# Patient Record
Sex: MALE | Race: WHITE
[De-identification: names, ages, dates, MRNs, and addresses within clinical notes are randomized per-mention and may not be internally consistent; named-entity substitution may affect disease eponyms.]

---

## 2017-04-17 ENCOUNTER — HOSPITAL ENCOUNTER (OUTPATIENT)
Dept: HOSPITAL 17 - CLAB | Age: 82
End: 2017-04-17
Payer: MEDICARE

## 2017-04-17 DIAGNOSIS — E78.5: ICD-10-CM

## 2017-04-17 DIAGNOSIS — L93.0: Primary | ICD-10-CM

## 2017-04-17 DIAGNOSIS — C61: ICD-10-CM

## 2017-04-17 LAB
ALP SERPL-CCNC: 73 U/L (ref 45–117)
ALT SERPL-CCNC: 32 U/L (ref 12–78)
ANION GAP SERPL CALC-SCNC: 7 MEQ/L (ref 5–15)
AST SERPL-CCNC: 22 U/L (ref 15–37)
BILIRUB SERPL-MCNC: 0.5 MG/DL (ref 0.2–1)
BUN SERPL-MCNC: 15 MG/DL (ref 7–18)
CHLORIDE SERPL-SCNC: 106 MEQ/L (ref 98–107)
ERYTHROCYTE [DISTWIDTH] IN BLOOD BY AUTOMATED COUNT: 13.9 % (ref 11.6–17.2)
GFR SERPLBLD BASED ON 1.73 SQ M-ARVRAT: 65 ML/MIN (ref 89–?)
HCO3 BLD-SCNC: 27.7 MEQ/L (ref 21–32)
HCT VFR BLD CALC: 39.3 % (ref 39–51)
HDLC SERPL-MCNC: 46.4 MG/DL (ref 40–60)
LDLC SERPL-MCNC: 74 MG/DL (ref 0–99)
MCH RBC QN AUTO: 30.7 PG (ref 27–34)
MCHC RBC AUTO-ENTMCNC: 33.9 % (ref 32–36)
MCV RBC AUTO: 90.8 FL (ref 80–100)
PLATELET # BLD: 246 TH/MM3 (ref 150–450)
POTASSIUM SERPL-SCNC: 4.2 MEQ/L (ref 3.5–5.1)
RBC # BLD AUTO: 4.32 MIL/MM3 (ref 4.5–5.9)
REVIEW FLAG: (no result)
SODIUM SERPL-SCNC: 141 MEQ/L (ref 136–145)
WBC # BLD AUTO: 7.6 TH/MM3 (ref 4–11)

## 2017-04-17 PROCEDURE — 85027 COMPLETE CBC AUTOMATED: CPT

## 2017-04-17 PROCEDURE — 80061 LIPID PANEL: CPT

## 2017-04-17 PROCEDURE — 84443 ASSAY THYROID STIM HORMONE: CPT

## 2017-04-17 PROCEDURE — 80053 COMPREHEN METABOLIC PANEL: CPT

## 2017-04-17 PROCEDURE — 36415 COLL VENOUS BLD VENIPUNCTURE: CPT

## 2017-06-16 ENCOUNTER — HOSPITAL ENCOUNTER (EMERGENCY)
Dept: HOSPITAL 17 - NEPD | Age: 82
Discharge: HOME | End: 2017-06-16
Payer: MEDICARE

## 2017-06-16 VITALS
TEMPERATURE: 97.8 F | HEART RATE: 107 BPM | SYSTOLIC BLOOD PRESSURE: 116 MMHG | OXYGEN SATURATION: 96 % | RESPIRATION RATE: 18 BRPM | DIASTOLIC BLOOD PRESSURE: 64 MMHG

## 2017-06-16 DIAGNOSIS — R25.2: ICD-10-CM

## 2017-06-16 DIAGNOSIS — Z79.01: ICD-10-CM

## 2017-06-16 DIAGNOSIS — I48.91: ICD-10-CM

## 2017-06-16 DIAGNOSIS — M79.671: Primary | ICD-10-CM

## 2017-06-16 PROCEDURE — 99283 EMERGENCY DEPT VISIT LOW MDM: CPT

## 2017-06-16 PROCEDURE — 73630 X-RAY EXAM OF FOOT: CPT

## 2017-06-16 NOTE — RADRPT
EXAM DATE/TIME:  06/16/2017 14:48 

 

[facility] COMPARISON:     

No previous studies available for comparison.

 

                     

INDICATIONS :     

Right foot pain after foot cramps for two days.

                     

 

MEDICAL HISTORY :     

None.          

 

SURGICAL HISTORY :     

None.   

 

ENCOUNTER:     

Initial                                        

 

ACUITY:     

2 days      

 

PAIN SCORE:     

5/10

 

LOCATION:     

Right foot.

 

FINDINGS:     

There is no acute fracture or dislocation of the right foot.  Tiny plantar calcaneal spur is noted. 

 

CONCLUSION:

1. No acute fracture or dislocation.  

2. Tiny plantar calcaneal spur.

     

 

 

 

 Néstor Echols MD on June 16, 2017 at 15:18                

Board Certified Radiologist.

 This report was verified electronically.

## 2017-06-16 NOTE — PD
HPI


Chief Complaint:  Musculoskeletal Complaint


Time Seen by Provider:  14:28


Travel History


International Travel<30 days:  No


Contact w/Intl Traveler<30days:  No


Traveled to known affect area:  No





History of Present Illness


HPI


81 YO M with PMH of janine Hyde presents to the ED for evaluation of 2 

days history of right foot pain. The patient states that he awoke in the middle 

of the night 2 nights ago with a "terrible" cramp in the right foot. He states 

that he was able to work the cramp out by standing on the leg.  He denies 

numbness, tingling, weakness, limitations to ROM, or known injury.  He treated 

today with 2 Tylenol with only mild improvement of symptoms.





PFSH


Past Medical History


Hx Anticoagulant Therapy:  Yes (ELOQUIS)


Cancer:  No


Cardiovascular Problems:  Yes (A-FIB)


Diabetes:  No


Hiatal Hernia:  No


Hypertension:  No


Thyroid Disease:  No





Past Surgical History


Eye Surgery:  Yes (left eye cataract removed)


Genitourinary Surgery:  Yes (TURP)


Pacemaker:  No





Social History


Alcohol Use:  Yes (beer daily)


Tobacco Use:  No





Allergies-Medications


(Allergen,Severity, Reaction):  


Coded Allergies:  


     Latex (Verified  Allergy, Unknown, 6/16/17)


 causes hives


Reported Meds & Prescriptions





Reported Meds & Active Scripts


Active


Travatan Z Opth Drops (Travoprost) 0.004 % Soln 1 Drop EACH EYE HS


Reported


Travatan Z Opth Drops (Travoprost) 0.004 % Soln 1 Drop EACH EYE HS


Preservision Areds 2 (Multiple Vitamins W/ Minerals) 1 Cap 1 Cap PO DAILY


Centrum Silver (Multiple Vitamins W/ Minerals) 1 Tab 1 Tab PO DAILY


Flaxseed Oil (Flaxseed (Linseed)) 1,000 Mg Cap 1,000 Mg PO DAILY


Vitamin D3 (Cholecalciferol) 1,000 Unit Tab 1,000 Units PO DAILY


Biotin 10 Mg Tab 1,000 Mcg PO DAILY


Aspirin 81 Mg Tabdr 81 Mg PO DAILY








Review of Systems


Except as stated in HPI:  all other systems reviewed are Neg





Physical Exam


Narrative


GENERAL: Well-nourished, well-developed elderly white male in NAD. 


SKIN: Focused skin assessment warm/dry.


HEAD: Normocephalic.


EYES: No scleral icterus. No injection or drainage. 


NECK: Supple, trachea midline. No JVD or lymphadenopathy.


CARDIOVASCULAR: Regular rate and rhythm without murmurs, gallops, or rubs. 


RESPIRATORY: Breath sounds equal bilaterally. No accessory muscle use.


GASTROINTESTINAL: Abdomen soft, non-tender, nondistended. 


MUSCULOSKELETAL: No cyanosis, or edema.  Hohmann sign negative on the right. 

Ambulatory with a walker.


FOCUSED RIGHT LOWER EXTREMITY EXAM: 2+ DP pulse. Mild TTP of the dorsal aspect 

of the right forefoot. No limitation to ROM of the toes or ankle. Sensation 

intact to light touch distally. Cap refill less than 2 seconds.


BACK: Nontender without obvious deformity. No CVA tenderness.





Data


Data


Last Documented VS





Vital Signs








  Date Time  Temp Pulse Resp B/P Pulse Ox O2 Delivery O2 Flow Rate FiO2


 


6/16/17 14:09 97.8 107 18 116/64 96   








Orders





 Foot, Complete ([id number]) (6/16/17 14:27)








[facility]


Medical Decision Making


Medical Screen Exam Complete:  Yes


Emergency Medical Condition:  Yes


Differential Diagnosis


muscle spasm versus muscle strain versus musculoskeletal pain versus retained 

FB versus fracture versus dislocation versus OA versus other


Narrative Course


81 YO M with PMH of janine Hyde presents to the ED for evaluation of 2 

days history of right foot pain. The patient states that he awoke in the middle 

of the night 2 nights ago with a "terrible" cramp in the right foot. He states 

that he was able to work the cramp out by standing on the leg.  He denies 

numbness, tingling, weakness, limitations to ROM, or known injury.  He treated 

today with 2 Tylenol with only mild improvement of symptoms. Vitals reviewed. 

Physical exam reveals an elderly white male in NAD. Hohmann sign negative on 

the right. Ambulatory with a walker. FOCUSED RIGHT LOWER EXTREMITY EXAM: 2+ DP 

pulse. Mild TTP of the dorsal aspect of the right forefoot. No limitation to 

ROM of the toes or ankle. Sensation intact to light touch distally. Cap refill 

less than 2 seconds. Xray reveals no bony injury. This is musculoskeletal pain. 

Patient was instructed to rest, ice, elevate the extremity. Pain medications 

options limited 2/2 Eliquis, narcotics aren't indicated. Patient was instructed 

to continue with Tylenol as directed on the package, follow with the PCP or a 

podiatrist. He indicated understanding of the instructions and is agreeable to 

the care plan. He is stable and discharged home.





Diagnosis





 Primary Impression:  


 Right foot pain


Referrals:  


Podiatrist


Patient Instructions:  General Instructions, Musculoskeletal Pain (ED)





***Additional Instructions:


Rest, ice, elevate the extremity.


Apply ice no longer than 10-15 minutes per hour a few times a day.


Tylenol as directed on the label, as needed for pain.


Return to normal, gentle activity as tolerated.


Follow up with podiatrist or your primary care provider.


Return to the ED for any urgent or emergent medical condition.


Disposition:  01 DISCHARGE HOME


Condition:  Stable








Eva Trimble Jun 16, 2017 14:39

## 2017-06-21 ENCOUNTER — HOSPITAL ENCOUNTER (OUTPATIENT)
Dept: HOSPITAL 17 - CLAB | Age: 82
End: 2017-06-21
Payer: MEDICARE

## 2017-06-21 DIAGNOSIS — M10.9: Primary | ICD-10-CM

## 2017-06-21 DIAGNOSIS — L03.031: ICD-10-CM

## 2017-06-21 LAB
BASOPHILS # BLD AUTO: 0 TH/MM3 (ref 0–0.2)
BASOPHILS NFR BLD: 0.6 % (ref 0–2)
EOSINOPHIL # BLD: 0.1 TH/MM3 (ref 0–0.4)
EOSINOPHIL NFR BLD: 0.7 % (ref 0–4)
ERYTHROCYTE [DISTWIDTH] IN BLOOD BY AUTOMATED COUNT: 14.1 % (ref 11.6–17.2)
ERYTHROCYTE [SEDIMENTATION RATE] IN BLOOD BY WESTERGREN METHOD: 70 MM/HR (ref 0–20)
HCT VFR BLD CALC: 37.4 % (ref 39–51)
HEMO FLAGS: (no result)
LYMPHOCYTES # BLD AUTO: 0.5 TH/MM3 (ref 1–4.8)
LYMPHOCYTES NFR BLD AUTO: 5.8 % (ref 9–44)
MCH RBC QN AUTO: 30.3 PG (ref 27–34)
MCHC RBC AUTO-ENTMCNC: 33.7 % (ref 32–36)
MCV RBC AUTO: 89.8 FL (ref 80–100)
MONOCYTES NFR BLD: 9.6 % (ref 0–8)
NEUTROPHILS # BLD AUTO: 7 TH/MM3 (ref 1.8–7.7)
NEUTROPHILS NFR BLD AUTO: 83.3 % (ref 16–70)
PLATELET # BLD: 267 TH/MM3 (ref 150–450)
RBC # BLD AUTO: 4.17 MIL/MM3 (ref 4.5–5.9)
WBC # BLD AUTO: 8.4 TH/MM3 (ref 4–11)

## 2017-06-21 PROCEDURE — 85652 RBC SED RATE AUTOMATED: CPT

## 2017-06-21 PROCEDURE — 85025 COMPLETE CBC W/AUTO DIFF WBC: CPT

## 2017-06-21 PROCEDURE — 36415 COLL VENOUS BLD VENIPUNCTURE: CPT

## 2017-06-21 PROCEDURE — 84550 ASSAY OF BLOOD/URIC ACID: CPT

## 2017-06-26 ENCOUNTER — HOSPITAL ENCOUNTER (OUTPATIENT)
Dept: HOSPITAL 17 - CLAB | Age: 82
End: 2017-06-26
Payer: MEDICARE

## 2017-06-26 DIAGNOSIS — R79.89: Primary | ICD-10-CM

## 2017-06-26 DIAGNOSIS — R94.5: ICD-10-CM

## 2017-06-26 LAB
ALP SERPL-CCNC: 82 U/L (ref 45–117)
ALT SERPL-CCNC: 53 U/L (ref 12–78)
AST SERPL-CCNC: 28 U/L (ref 15–37)
BILIRUB INDIRECT SERPL-MCNC: 0.4 MG/DL (ref 0–0.8)
BILIRUB SERPL-MCNC: 0.6 MG/DL (ref 0.2–1)

## 2017-06-26 PROCEDURE — 80076 HEPATIC FUNCTION PANEL: CPT

## 2017-06-26 PROCEDURE — 36415 COLL VENOUS BLD VENIPUNCTURE: CPT

## 2017-06-26 PROCEDURE — 80074 ACUTE HEPATITIS PANEL: CPT

## 2017-07-05 ENCOUNTER — HOSPITAL ENCOUNTER (OUTPATIENT)
Dept: HOSPITAL 17 - NEPD | Age: 82
Setting detail: OBSERVATION
LOS: 2 days | Discharge: HOME | End: 2017-07-07
Attending: HOSPITALIST | Admitting: HOSPITALIST
Payer: MEDICARE

## 2017-07-05 VITALS
HEART RATE: 102 BPM | OXYGEN SATURATION: 99 % | DIASTOLIC BLOOD PRESSURE: 63 MMHG | RESPIRATION RATE: 20 BRPM | SYSTOLIC BLOOD PRESSURE: 124 MMHG

## 2017-07-05 VITALS
DIASTOLIC BLOOD PRESSURE: 69 MMHG | SYSTOLIC BLOOD PRESSURE: 106 MMHG | HEART RATE: 102 BPM | RESPIRATION RATE: 18 BRPM | TEMPERATURE: 97.6 F | OXYGEN SATURATION: 97 %

## 2017-07-05 VITALS
SYSTOLIC BLOOD PRESSURE: 101 MMHG | DIASTOLIC BLOOD PRESSURE: 64 MMHG | RESPIRATION RATE: 20 BRPM | TEMPERATURE: 97.4 F | OXYGEN SATURATION: 98 % | HEART RATE: 87 BPM

## 2017-07-05 VITALS
DIASTOLIC BLOOD PRESSURE: 55 MMHG | SYSTOLIC BLOOD PRESSURE: 103 MMHG | HEART RATE: 67 BPM | TEMPERATURE: 98.7 F | OXYGEN SATURATION: 95 % | RESPIRATION RATE: 16 BRPM

## 2017-07-05 VITALS
DIASTOLIC BLOOD PRESSURE: 74 MMHG | SYSTOLIC BLOOD PRESSURE: 116 MMHG | RESPIRATION RATE: 17 BRPM | HEART RATE: 102 BPM | OXYGEN SATURATION: 97 %

## 2017-07-05 VITALS — BODY MASS INDEX: 25.78 KG/M2 | HEIGHT: 63 IN | WEIGHT: 145.51 LBS

## 2017-07-05 VITALS
DIASTOLIC BLOOD PRESSURE: 68 MMHG | HEART RATE: 108 BPM | SYSTOLIC BLOOD PRESSURE: 118 MMHG | RESPIRATION RATE: 18 BRPM | TEMPERATURE: 98.1 F | OXYGEN SATURATION: 90 %

## 2017-07-05 VITALS
HEART RATE: 84 BPM | SYSTOLIC BLOOD PRESSURE: 129 MMHG | TEMPERATURE: 97.4 F | DIASTOLIC BLOOD PRESSURE: 96 MMHG | RESPIRATION RATE: 20 BRPM | OXYGEN SATURATION: 96 %

## 2017-07-05 VITALS
OXYGEN SATURATION: 98 % | HEART RATE: 105 BPM | DIASTOLIC BLOOD PRESSURE: 78 MMHG | RESPIRATION RATE: 20 BRPM | SYSTOLIC BLOOD PRESSURE: 123 MMHG

## 2017-07-05 VITALS
OXYGEN SATURATION: 99 % | SYSTOLIC BLOOD PRESSURE: 110 MMHG | RESPIRATION RATE: 20 BRPM | DIASTOLIC BLOOD PRESSURE: 68 MMHG | HEART RATE: 99 BPM

## 2017-07-05 DIAGNOSIS — I10: ICD-10-CM

## 2017-07-05 DIAGNOSIS — R61: ICD-10-CM

## 2017-07-05 DIAGNOSIS — Z79.899: ICD-10-CM

## 2017-07-05 DIAGNOSIS — I95.9: ICD-10-CM

## 2017-07-05 DIAGNOSIS — Z79.01: ICD-10-CM

## 2017-07-05 DIAGNOSIS — I48.91: ICD-10-CM

## 2017-07-05 DIAGNOSIS — Z85.46: ICD-10-CM

## 2017-07-05 DIAGNOSIS — R53.1: ICD-10-CM

## 2017-07-05 DIAGNOSIS — M79.671: ICD-10-CM

## 2017-07-05 DIAGNOSIS — K52.9: Primary | ICD-10-CM

## 2017-07-05 DIAGNOSIS — E86.0: ICD-10-CM

## 2017-07-05 DIAGNOSIS — Z92.3: ICD-10-CM

## 2017-07-05 LAB
ALP SERPL-CCNC: 88 U/L (ref 45–117)
ALT SERPL-CCNC: 43 U/L (ref 12–78)
ANION GAP SERPL CALC-SCNC: 12 MEQ/L (ref 5–15)
AST SERPL-CCNC: 45 U/L (ref 15–37)
BASOPHILS # BLD AUTO: 0 TH/MM3 (ref 0–0.2)
BASOPHILS NFR BLD: 0.3 % (ref 0–2)
BILIRUB SERPL-MCNC: 0.7 MG/DL (ref 0.2–1)
BUN SERPL-MCNC: 21 MG/DL (ref 7–18)
C. DIFF EPI 027: (no result)
C. DIFF TOXIN PCR: NEGATIVE
CHLORIDE SERPL-SCNC: 103 MEQ/L (ref 98–107)
COLOR UR: YELLOW
COMMENT (UR): (no result)
CULTURE IF INDICATED: (no result)
EOSINOPHIL # BLD: 0.1 TH/MM3 (ref 0–0.4)
EOSINOPHIL NFR BLD: 1.2 % (ref 0–4)
ERYTHROCYTE [DISTWIDTH] IN BLOOD BY AUTOMATED COUNT: 13.7 % (ref 11.6–17.2)
GFR SERPLBLD BASED ON 1.73 SQ M-ARVRAT: 64 ML/MIN (ref 89–?)
GLUCOSE UR STRIP-MCNC: (no result) MG/DL
HCO3 BLD-SCNC: 20.2 MEQ/L (ref 21–32)
HCT VFR BLD CALC: 37.5 % (ref 39–51)
HEMO FLAGS: (no result)
HGB UR QL STRIP: (no result)
HYALINE CASTS #/AREA URNS LPF: 3 /LPF
KETONES UR STRIP-MCNC: (no result) MG/DL
LYMPHOCYTES # BLD AUTO: 0.4 TH/MM3 (ref 1–4.8)
LYMPHOCYTES NFR BLD AUTO: 4.8 % (ref 9–44)
MCH RBC QN AUTO: 30.3 PG (ref 27–34)
MCHC RBC AUTO-ENTMCNC: 34.4 % (ref 32–36)
MCV RBC AUTO: 88.2 FL (ref 80–100)
MONOCYTES NFR BLD: 9 % (ref 0–8)
MUCOUS THREADS #/AREA URNS LPF: (no result) /LPF
NEUTROPHILS # BLD AUTO: 7.4 TH/MM3 (ref 1.8–7.7)
NEUTROPHILS NFR BLD AUTO: 84.7 % (ref 16–70)
NITRITE UR QL STRIP: (no result)
PLATELET # BLD: 415 TH/MM3 (ref 150–450)
POTASSIUM SERPL-SCNC: 4 MEQ/L (ref 3.5–5.1)
RBC # BLD AUTO: 4.25 MIL/MM3 (ref 4.5–5.9)
SODIUM SERPL-SCNC: 135 MEQ/L (ref 136–145)
SP GR UR STRIP: 1.01 (ref 1–1.03)
WBC # BLD AUTO: 8.7 TH/MM3 (ref 4–11)

## 2017-07-05 PROCEDURE — 81001 URINALYSIS AUTO W/SCOPE: CPT

## 2017-07-05 PROCEDURE — 96361 HYDRATE IV INFUSION ADD-ON: CPT

## 2017-07-05 PROCEDURE — 87040 BLOOD CULTURE FOR BACTERIA: CPT

## 2017-07-05 PROCEDURE — 87493 C DIFF AMPLIFIED PROBE: CPT

## 2017-07-05 PROCEDURE — 97161 PT EVAL LOW COMPLEX 20 MIN: CPT

## 2017-07-05 PROCEDURE — 80048 BASIC METABOLIC PNL TOTAL CA: CPT

## 2017-07-05 PROCEDURE — 80053 COMPREHEN METABOLIC PANEL: CPT

## 2017-07-05 PROCEDURE — 87506 IADNA-DNA/RNA PROBE TQ 6-11: CPT

## 2017-07-05 PROCEDURE — 85025 COMPLETE CBC W/AUTO DIFF WBC: CPT

## 2017-07-05 PROCEDURE — G0378 HOSPITAL OBSERVATION PER HR: HCPCS

## 2017-07-05 PROCEDURE — 96360 HYDRATION IV INFUSION INIT: CPT

## 2017-07-05 PROCEDURE — 99285 EMERGENCY DEPT VISIT HI MDM: CPT

## 2017-07-05 RX ADMIN — APIXABAN SCH MG: 5 TABLET, FILM COATED ORAL at 22:16

## 2017-07-05 RX ADMIN — COLCHICINE SCH MG: 0.6 TABLET, FILM COATED ORAL at 18:00

## 2017-07-05 RX ADMIN — LATANOPROST SCH DROP: 50 SOLUTION OPHTHALMIC at 22:15

## 2017-07-05 RX ADMIN — Medication SCH ML: at 21:00

## 2017-07-05 RX ADMIN — THIAMINE HYDROCHLORIDE SCH MLS/HR: 100 INJECTION, SOLUTION INTRAMUSCULAR; INTRAVENOUS at 16:29

## 2017-07-05 NOTE — PD
HPI


.


Diarrhea


Chief Complaint:  GI Complaint


Time Seen by Provider:  09:37


Travel History


International Travel<30 days:  No


Contact w/Intl Traveler<30days:  No


Traveled to known affect area:  No





History of Present Illness


HPI


This patient presents along with his wife with the chief complaint of nausea, 

vomiting and diarrhea.  He reports the onset of his symptoms 3 days ago with 

diarrhea.  He reports too numerous to count episodes of diarrhea that day.  He 

treated it with 2 doses of Imodium.  He states that he subsequently had some 

emesis.  The symptoms continued into the next day.  However, the next 2 days 

were okay.  Then his symptoms recurred this morning.  He called his doctor who 

instructed him to come to the emergency department for further evaluation and 

treatment.  Patient reports markedly decreased energy and possible fevers.  

Denies any pain.  No other associated symptoms.  His symptoms were not relieved 

by Imodium and he knows of no exacerbating factor.





PFSH


Past Medical History


Hx Anticoagulant Therapy:  Yes (Eliquis)


Cancer:  Yes (prostate )


Cardiovascular Problems:  Yes (on Eliquis, A.fib)


Chemotherapy:  Yes (Radiation with prostate cancer )


Diabetes:  No


Hiatal Hernia:  No


Hypertension:  Yes


Medical other:  Yes (lupus )


Thyroid Disease:  No


Pregnant?:  Not Pregnant





Past Surgical History


Eye Surgery:  Yes (both eye cataract removed)


Genitourinary Surgery:  Yes (TURP)


Pacemaker:  No


Other Surgery:  Yes





Social History


Alcohol Use:  Yes (occassionaly )


Tobacco Use:  No


Substance Use:  No





Allergies-Medications


(Allergen,Severity, Reaction):  


Coded Allergies:  


     Latex (Verified  Allergy, Unknown, 7/5/17)


 causes hives


Reported Meds & Prescriptions





Reported Meds & Active Scripts


Active


Reported


Preservision Areds (Multiple Vitamins W/ Minerals) 1 Tab 1 Tab PO DAILY


Centrum (Multiple Vitamins W/ Minerals) 1 Chew 1 Tab CHEW DAILY


Flaxseed Oil (Flaxseed (Linseed)) 1,000 Mg Cap 1 Cap PO DAILY


Colchicine 0.6 Mg Tab 0.6 Mg PO TID


Eliquis (Apixaban) 5 Mg Tab 5 Mg PO BID


Metoprolol Succinate ER 24 HR (Metoprolol Succinate) 50 Mg Tab 50 Mg PO DAILY


Travatan Z Opth Drops (Travoprost) 0.004 % Soln 1 Drop EACH EYE HS


Vitamin D3 (Cholecalciferol) 1,000 Unit Tab 1,000 Units PO DAILY


Biotin 10 Mg Tab 1,000 Mcg PO DAILY








Review of Systems


Except as stated in HPI:  all other systems reviewed are Neg


General / Constitutional:  Positive: Fever, Chills


Gastrointestinal:  Positive: Nausea, Vomiting, Diarrhea,  No: Abdominal Pain


Genitourinary:  No: Urgency, Frequency, Dysuria





Physical Exam


Narrative





 Vital Signs








  Date Time  Temp Pulse Resp B/P Pulse Ox O2 Delivery O2 Flow Rate FiO2


 


7/5/17 09:41  99 20 110/68 99   


 


7/5/17 09:32 98.7 67 16 103/55 95 Room Air  


 


7/5/17 09:25 97.4 87 20 101/64 98 Room Air  








GENERAL: Elderly man in no acute distress.


SKIN: Warm and dry.


HEAD: Atraumatic. Normocephalic. 


EYES: Pupils equal and round.  Extraocular movements are intact.


ENT: No nasal bleeding or discharge.  Mucous membranes pink and moist.


NECK: Trachea midline.  Neck is supple.


CARDIOVASCULAR: Regular rate and rhythm.  Heart sounds are normal.


RESPIRATORY: No accessory muscle use.  Lungs are clear with full air movement 

throughout.


GASTROINTESTINAL: Abdomen soft, non-tender, nondistended. 


MUSCULOSKELETAL: No obvious deformities.   No edema. 


NEUROLOGICAL: Awake and alert. No obvious cranial nerve deficits.  Motor 

grossly within normal limits. Normal speech.


PSYCHIATRIC: Appropriate mood and affect; insight and judgment normal.





Data


Data


Last Documented VS





Vital Signs








  Date Time  Temp Pulse Resp B/P Pulse Ox O2 Delivery O2 Flow Rate FiO2


 


7/5/17 11:58  102 20 124/63 99 Room Air  


 


7/5/17 09:32 98.7       








Orders





 Complete Blood Count With Diff (7/5/17 09:54)


Comprehensive Metabolic Panel (7/5/17 09:54)


Urinalysis - C+S If Indicated (7/5/17 09:54)


Iv Access Insert/Monitor (7/5/17 09:54)


Sodium Chlor 0.9% 1000 Ml Inj (Ns 1000 M (7/5/17 09:54)


Sodium Chloride 0.9% Flush (Ns Flush) (7/5/17 10:00)


Blood Culture (7/5/17 09:54)


C Diff Toxin Pcr (7/5/17 09:54)


Enteric Path (Stool) (7/5/17 09:54)


Sodium Chlor 0.9% 1000 Ml Inj (Ns 1000 M (7/5/17 11:30)


Sodium Chlor 0.9% 1000 Ml Inj (Ns 1000 M (7/5/17 13:30)





Labs





 Laboratory Tests








Test 7/5/17 7/5/17





 10:00 13:10


 


White Blood Count 8.7 TH/MM3 


 


Red Blood Count 4.25 MIL/MM3 


 


Hemoglobin 12.9 GM/DL 


 


Hematocrit 37.5 % 


 


Mean Corpuscular Volume 88.2 FL 


 


Mean Corpuscular Hemoglobin 30.3 PG 


 


Mean Corpuscular Hemoglobin 34.4 % 





Concent  


 


Red Cell Distribution Width 13.7 % 


 


Platelet Count 415 TH/MM3 


 


Mean Platelet Volume 7.4 FL 


 


Neutrophils (%) (Auto) 84.7 % 


 


Lymphocytes (%) (Auto) 4.8 % 


 


Monocytes (%) (Auto) 9.0 % 


 


Eosinophils (%) (Auto) 1.2 % 


 


Basophils (%) (Auto) 0.3 % 


 


Neutrophils # (Auto) 7.4 TH/MM3 


 


Lymphocytes # (Auto) 0.4 TH/MM3 


 


Monocytes # (Auto) 0.8 TH/MM3 


 


Eosinophils # (Auto) 0.1 TH/MM3 


 


Basophils # (Auto) 0.0 TH/MM3 


 


CBC Comment DIFF FINAL  


 


Differential Comment   


 


Sodium Level 135 MEQ/L 


 


Potassium Level 4.0 MEQ/L 


 


Chloride Level 103 MEQ/L 


 


Carbon Dioxide Level 20.2 MEQ/L 


 


Anion Gap 12 MEQ/L 


 


Blood Urea Nitrogen 21 MG/DL 


 


Creatinine 1.10 MG/DL 


 


Estimat Glomerular Filtration 64 ML/MIN 





Rate  


 


Random Glucose 91 MG/DL 


 


Calcium Level 9.1 MG/DL 


 


Total Bilirubin 0.7 MG/DL 


 


Aspartate Amino Transf 45 U/L 





(AST/SGOT)  


 


Alanine Aminotransferase 43 U/L 





(ALT/SGPT)  


 


Alkaline Phosphatase 88 U/L 


 


Total Protein 7.4 GM/DL 


 


Albumin 2.8 GM/DL 


 


Urine Color  YELLOW 


 


Urine Turbidity  CLEAR 


 


Urine pH  5.5 


 


Urine Specific Gravity  1.008 


 


Urine Protein  NEG mg/dL


 


Urine Glucose (UA)  NEG mg/dL


 


Urine Ketones  NEG mg/dL


 


Urine Occult Blood  NEG 


 


Urine Nitrite  NEG 


 


Urine Bilirubin  NEG 


 


Urine Urobilinogen  LESS THAN 2.0





  MG/DL


 


Urine Leukocyte Esterase  NEG 


 


Urine WBC  LESS THAN 1





  /hpf


 


Urine Hyaline Casts  3 /lpf


 


Urine Mucus  FEW /lpf


 


Microscopic Urinalysis Comment  CULT NOT





  INDICATED











MDM


Medical Decision Making


Medical Screen Exam Complete:  Yes


Emergency Medical Condition:  Yes


Differential Diagnosis


Differential diagnosis of diarrhea includes but is not limited to early 

enteritis, bacterial enteritis, antibiotic induced diarrhea, irritable bowel 

syndrome


Narrative Course


This is an elderly patient who presents with mainly diarrhea with some 

vomiting.  His blood pressure is low for him.  He will be fluid resuscitated.  

I have ordered stool for enteric pathogens and C. difficile.  Basic labs are 

pending.





CBC & BMP Diagram


7/5/17 10:00








 UA neg for infection.





Patient reports that he still feels very weak following 2 L of fluid.  He has 

put out very little urine.  He will be admitted to observation for hydration.





Diagnosis





 Primary Impression:  


 Diarrhea


 Qualified Code:  R19.7 - Diarrhea, unspecified type





Admitting Information


Admitting Physician Requests:  Observation


Patient Instructions:  Acute Diarrhea (ED), General Instructions


Condition:  Stable








Irene Eldridge MD Jul 5, 2017 10:06

## 2017-07-05 NOTE — HHI.HP
__________________________________________________





HPI


Service


[facility]


Primary Care Physician


Marah Jules MD


Admission Diagnosis


diarrhea, weakness


Diagnoses:  


Chief Complaint:  


Diarrhea, weakness


Travel History


International Travel<30 Days:  No


Contact w/Intl Traveler <30 Da:  No


Traveled to Known Affected Are:  No





Sepsis Criteria


SIRS Criteria (2 or more):  Heart rate over 90


History of Present Illness


Written by Douglas Renteria, acting as scribe for Dr. Castañeda on 7/5/17 at 14:19. 





82-year-old male with past medical history of A. fib, gout, prostate cancer s/p 

xrt, lupus in remission who presented with diarrhea and weakness.  The patient 

states that for the past 5 days he's been having green liquid stools.  He 

denies any unusual food, states Friday and Saturday he ate with his entire 

family and no in house got sick.  He states that Saturday and Sunday he was 

having vomiting, but that resolved.  He has has decreased appetite, but 

tolerated full diet all day yesterday.  He states the diarrhea was improving 

yesterday, but got acutely worse again today.  He states he had night sweats 2 

nights ago, otherwise no fever or chills.  He denies any abdominal pain.  He 

does state that he was on antibiotics one month ago for right foot gout.  He 

has been using a walker for ambulation due to foot pain from the gout, although 

his foot is improving.  The patient does state that his wife had 3 loose bowel 

movements today for the first time.





Review of Systems


Except as stated in HPI:  all other systems reviewed are Neg





Past Family Social History


Past Medical History


Atrial fibrillation


Gout


Prostate cancer status post radiation in 2012


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


Past Surgical History


Cataract surgery bilaterally


TURP


Reported Medications





Preservision Areds (Multiple Vitamins W/ Minerals) 1 Tab 1 Tab PO DAILY


Centrum (Multiple Vitamins W/ Minerals) 1 Chew 1 Tab CHEW DAILY


Flaxseed Oil (Flaxseed (Linseed)) 1,000 Mg Cap 1 Cap PO DAILY


Colchicine 0.6 Mg Tab 0.6 Mg PO TID


Eliquis (Apixaban) 5 Mg Tab 5 Mg PO BID


Metoprolol Succinate ER 24 HR (Metoprolol Succinate) 50 Mg Tab 50 Mg PO DAILY


Travatan Z Opth Drops (Travoprost) 0.004 % Soln 1 Drop EACH EYE HS


Vitamin D3 (Cholecalciferol) 1,000 Unit Tab 1,000 Units PO DAILY


Biotin 10 Mg Tab 1,000 Mcg PO DAILY


Allergies:  


Coded Allergies:  


     Latex (Verified  Allergy, Unknown, 7/5/17)


 causes hives


Active Ordered Medications





 Current Medications








 Medications


  (Trade)  Dose


 Ordered  Sig/Finn


 Route  Start Time


 Stop Time Status Last Admin


 


 Sodium Chloride 2


 ml  2 ml  UNSCH  PRN


 IVF  7/5/17 10:00


     


 


 


  (NS 1000 ml Inj)  1,000 ml @ 


 999 mls/hr  BOLUS  ONCE


 IV  7/5/17 13:30


 7/5/17 14:30  7/5/17 13:24


 


 


  (Eliquis)  5 mg  BID


 PO  7/5/17 21:00


   UNV  


 


 


  (Vitamin D3)  1,000 units  DAILY


 PO  7/6/17 09:00


   UNV  


 


 


  (Colchicine)  0.6 mg  TID


 PO  7/5/17 18:00


   UNV  


 


 


  (Toprol Xl)  50 mg  DAILY


 PO  7/6/17 09:00


   UNV  


 


 


  (Theragran M Tab)  1 tab  DAILY


 PO  7/6/17 09:00


   UNV  


 


 


 Non-Formulary


 Medication  1,000 mcg  DAILY


 PO  7/6/17 09:00


   UNV  


 


 


 Non-Formulary


 Medication  1 cap  DAILY


 PO  7/6/17 09:00


   UNV  


 


 


 Non-Formulary


 Medication  1 tab  DAILY


 PO  7/6/17 09:00


   UNV  


 


 


 Non-Formulary


 Medication 1 drop  1 drop  HS


 EACH EYE  7/5/17 21:00


   UNV  


 


 


  (1/2 NS 1000 ml


 Inj)  1,000 ml @ 


 75 mls/hr  [id number]


 IV  7/5/17 14:02


   UNV  


 


 


  (NS Flush)  2 ml  UNSCH  PRN


 IV FLUSH  7/5/17 14:15


   UNV  


 


 


  (NS Flush)  2 ml  BID


 IV FLUSH  7/5/17 21:00


   UNV  


 


 


  (Zofran Inj)  4 mg  Q6H  PRN


 IVP  7/5/17 14:15


   UNV  


 


 


  (Restoril)  15 mg  HS  PRN


 PO  7/5/17 14:15


   UNV  


 


 


  (Narcan Inj)  0.4 mg  UNSCH  PRN


 IV  7/5/17 14:15


   UNV  


 


 


  (Kiersten-Colace)  1 tab  BID


 PO  7/5/17 21:00


   UNV  


 


 


  (Milk Of


 Magnesia Liq)  30 ml  Q12H  PRN


 PO  7/5/17 14:15


   UNV  


 


 


  (Senokot)  17.2 mg  Q12H  PRN


 PO  7/5/17 14:15


   UNV  


 


 


  (Dulcolax Supp)  10 mg  DAILY  PRN


 RECTAL  7/5/17 14:15


   UNV  


 


 


  (Lactulose Liq)  30 ml  DAILY  PRN


 PO  7/5/17 14:15


   UNV  


 








Family History


Mother had lupus and rheumatoid arthritis


Social History


Has 1 or 2 alcoholic beverages per week


Denies any tobacco use


Lives at home with his wife





Physical Exam


Vital Signs





 Vital Signs








  Date Time  Temp Pulse Resp B/P Pulse Ox O2 Delivery O2 Flow Rate FiO2


 


7/5/17 11:58  102 20 124/63 99 Room Air  


 


7/5/17 09:41  99 20 110/68 99   


 


7/5/17 09:32 98.7 67 16 103/55 95 Room Air  


 


7/5/17 09:25 97.4 87 20 101/64 98 Room Air  








Physical Exam


GENERAL: Well-developed well-nourished.  In no acute distress.


SKIN: Warm and dry. No lesions noted.


HEENT: Normocephalic. Pupils equal and round. Mucous membranes pink and moist. 


CARDIOVASCULAR: Irregular rate and rhythm.  No murmur appreciated.


RESPIRATORY: No accessory muscle use. Clear to auscultation. Breath sounds 

equal bilaterally. 


GASTROINTESTINAL: Abdomen soft, non-tender, nondistended. Bowel sounds 

hyperactive.


MUSCULOSKELETAL: Right foot and ankle wrapped with an Ace bandage, no lesions 

noted. No clubbing or cyanosis. No edema. 


NEUROLOGICAL: Awake and alert. No focal neurological deficits.  Moves upper and 

lower extremities spontaneously. Normal speech.


PSYCHIATRIC: Appropriate mood and affect; insight and judgment normal.


Laboratory





Laboratory Tests








Test 7/5/17 7/5/17





 10:00 13:10


 


White Blood Count 8.7  


 


Red Blood Count 4.25  


 


Hemoglobin 12.9  


 


Hematocrit 37.5  


 


Mean Corpuscular Volume 88.2  


 


Mean Corpuscular Hemoglobin 30.3  


 


Mean Corpuscular Hemoglobin 34.4  





Concent  


 


Red Cell Distribution Width 13.7  


 


Platelet Count 415  


 


Mean Platelet Volume 7.4  


 


Neutrophils (%) (Auto) 84.7  


 


Lymphocytes (%) (Auto) 4.8  


 


Monocytes (%) (Auto) 9.0  


 


Eosinophils (%) (Auto) 1.2  


 


Basophils (%) (Auto) 0.3  


 


Neutrophils # (Auto) 7.4  


 


Lymphocytes # (Auto) 0.4  


 


Monocytes # (Auto) 0.8  


 


Eosinophils # (Auto) 0.1  


 


Basophils # (Auto) 0.0  


 


CBC Comment DIFF FINAL  


 


Differential Comment   


 


Sodium Level 135  


 


Potassium Level 4.0  


 


Chloride Level 103  


 


Carbon Dioxide Level 20.2  


 


Anion Gap 12  


 


Blood Urea Nitrogen 21  


 


Creatinine 1.10  


 


Estimat Glomerular Filtration 64  





Rate  


 


Random Glucose 91  


 


Calcium Level 9.1  


 


Total Bilirubin 0.7  


 


Aspartate Amino Transf 45  





(AST/SGOT)  


 


Alanine Aminotransferase 43  





(ALT/SGPT)  


 


Alkaline Phosphatase 88  


 


Total Protein 7.4  


 


Albumin 2.8  


 


Urine Color  YELLOW 


 


Urine Turbidity  CLEAR 


 


Urine pH  5.5 


 


Urine Specific Gravity  1.008 


 


Urine Protein  NEG 


 


Urine Glucose (UA)  NEG 


 


Urine Ketones  NEG 


 


Urine Occult Blood  NEG 


 


Urine Nitrite  NEG 


 


Urine Bilirubin  NEG 


 


Urine Urobilinogen  LESS THAN 2.0 


 


Urine Leukocyte Esterase  NEG 


 


Urine WBC  LESS THAN 1 


 


Urine Hyaline Casts  3 


 


Urine Mucus  FEW 


 


Microscopic Urinalysis Comment  CULT NOT





  INDICATED














 Date/Time Procedure Status





Source Growth 


 


 7/5/17 10:00 Aerobic Blood Culture Received





Blood Peripheral Pending 





 7/5/17 10:00 Anaerobic Blood Culture Received





Blood Peripheral Pending 








Result Diagram:  


7/5/17 1000                                                                    

            7/5/17 1000








Assessment and Plan


Assessment and Plan


82-year-old male with past medical history of A. fib, gout, prostate cancer s/p 

xrt, lupus in remission who presented with diarrhea and weakness





Gastroenteritis: Nausea, vomiting, diarrhea.  Nausea and vomiting have artery 

improved, however the patient had acute worsening of diarrhea today.  Afebrile 

with no leukocytosis.  Possible mild dehydration on labs and exam.  IVF.  Check 

stool studies including C. difficile with recent antibiotic use.





Acute weakness: Likely secondary to infection as above as well as right foot 

pain from recent gout flare.  PT eval.





Atrial fibrillation: Rate is currently elevated, likely due to dehydration.  

Given metoprolol 1 now.  Resume home extended release metoprolol.  Continue 

Eliquis.





Gout: No signs of acute flareup on exam.  Continue home colchicine.





DVT prophylaxis: On Eliquis.





This note was transcribed by [email or web address] [Douglas Renteria].  I, Dr. Rick Castañeda 

personally performed the history, physical exam, and medical decision making; 

and confirmed the accuracy of the information in the transcribed note.





Authenticated by Dr. Rick Castañeda on 7/5/17 at 1425.


Discussed Condition With


Patient, ED staff








Douglas Renteria Jul 5, 2017 14:30


Rick Castañeda MD Jul 5, 2017 15:42

## 2017-07-06 VITALS
OXYGEN SATURATION: 95 % | RESPIRATION RATE: 18 BRPM | TEMPERATURE: 97.8 F | DIASTOLIC BLOOD PRESSURE: 64 MMHG | SYSTOLIC BLOOD PRESSURE: 110 MMHG | HEART RATE: 114 BPM

## 2017-07-06 VITALS
HEART RATE: 93 BPM | TEMPERATURE: 98.6 F | RESPIRATION RATE: 18 BRPM | OXYGEN SATURATION: 94 % | DIASTOLIC BLOOD PRESSURE: 69 MMHG | SYSTOLIC BLOOD PRESSURE: 112 MMHG

## 2017-07-06 VITALS
RESPIRATION RATE: 18 BRPM | TEMPERATURE: 97.5 F | DIASTOLIC BLOOD PRESSURE: 74 MMHG | OXYGEN SATURATION: 94 % | HEART RATE: 112 BPM | SYSTOLIC BLOOD PRESSURE: 120 MMHG

## 2017-07-06 VITALS
SYSTOLIC BLOOD PRESSURE: 124 MMHG | DIASTOLIC BLOOD PRESSURE: 79 MMHG | RESPIRATION RATE: 18 BRPM | TEMPERATURE: 97.6 F | OXYGEN SATURATION: 95 % | HEART RATE: 118 BPM

## 2017-07-06 VITALS
TEMPERATURE: 97.9 F | DIASTOLIC BLOOD PRESSURE: 73 MMHG | SYSTOLIC BLOOD PRESSURE: 116 MMHG | OXYGEN SATURATION: 92 % | RESPIRATION RATE: 20 BRPM | HEART RATE: 103 BPM

## 2017-07-06 VITALS
HEART RATE: 90 BPM | TEMPERATURE: 97.9 F | DIASTOLIC BLOOD PRESSURE: 69 MMHG | OXYGEN SATURATION: 92 % | SYSTOLIC BLOOD PRESSURE: 111 MMHG | RESPIRATION RATE: 20 BRPM

## 2017-07-06 VITALS — HEART RATE: 85 BPM

## 2017-07-06 VITALS — HEART RATE: 75 BPM

## 2017-07-06 LAB
ANION GAP SERPL CALC-SCNC: 9 MEQ/L (ref 5–15)
BUN SERPL-MCNC: 14 MG/DL (ref 7–18)
CHLORIDE SERPL-SCNC: 107 MEQ/L (ref 98–107)
GFR SERPLBLD BASED ON 1.73 SQ M-ARVRAT: 87 ML/MIN (ref 89–?)
HCO3 BLD-SCNC: 20.3 MEQ/L (ref 21–32)
POTASSIUM SERPL-SCNC: 3.9 MEQ/L (ref 3.5–5.1)
SODIUM SERPL-SCNC: 136 MEQ/L (ref 136–145)

## 2017-07-06 RX ADMIN — DIPHENOXYLATE HYDROCHLORIDE AND ATROPINE SULFATE SCH TAB: 2.5; .025 TABLET ORAL at 22:00

## 2017-07-06 RX ADMIN — Medication SCH TAB: at 11:12

## 2017-07-06 RX ADMIN — APIXABAN SCH MG: 5 TABLET, FILM COATED ORAL at 09:11

## 2017-07-06 RX ADMIN — Medication SCH ML: at 09:00

## 2017-07-06 RX ADMIN — Medication SCH ML: at 20:13

## 2017-07-06 RX ADMIN — COLCHICINE SCH MG: 0.6 TABLET, FILM COATED ORAL at 16:30

## 2017-07-06 RX ADMIN — Medication SCH TAB: at 16:29

## 2017-07-06 RX ADMIN — LOPERAMIDE HYDROCHLORIDE PRN MG: 2 CAPSULE ORAL at 15:05

## 2017-07-06 RX ADMIN — METOPROLOL TARTRATE SCH MG: 25 TABLET, FILM COATED ORAL at 20:24

## 2017-07-06 RX ADMIN — LOPERAMIDE HYDROCHLORIDE PRN MG: 2 CAPSULE ORAL at 20:24

## 2017-07-06 RX ADMIN — VITAMIN D, TAB 1000IU (100/BT) SCH UNITS: 25 TAB at 11:13

## 2017-07-06 RX ADMIN — METOPROLOL TARTRATE SCH MG: 25 TABLET, FILM COATED ORAL at 09:11

## 2017-07-06 RX ADMIN — Medication SCH TAB: at 13:23

## 2017-07-06 RX ADMIN — APIXABAN SCH MG: 5 TABLET, FILM COATED ORAL at 20:24

## 2017-07-06 RX ADMIN — COLCHICINE SCH MG: 0.6 TABLET, FILM COATED ORAL at 13:27

## 2017-07-06 RX ADMIN — THIAMINE HYDROCHLORIDE SCH MLS/HR: 100 INJECTION, SOLUTION INTRAMUSCULAR; INTRAVENOUS at 16:42

## 2017-07-06 RX ADMIN — MULTIPLE VITAMINS W/ MINERALS TAB SCH TAB: TAB at 09:10

## 2017-07-06 RX ADMIN — THIAMINE HYDROCHLORIDE SCH MLS/HR: 100 INJECTION, SOLUTION INTRAMUSCULAR; INTRAVENOUS at 09:11

## 2017-07-06 RX ADMIN — LATANOPROST SCH DROP: 50 SOLUTION OPHTHALMIC at 20:24

## 2017-07-06 RX ADMIN — COLCHICINE SCH MG: 0.6 TABLET, FILM COATED ORAL at 11:13

## 2017-07-06 NOTE — HHI.PR
Subjective


Remarks


Follow-up for intractable diarrhea.  The patient states he's been having 

essentially constant liquid stool since admission.  He states he is on his 

second diaper since midnight.  He continues to complain of feeling weak and 

debilitated.  He denies any chest pain or shortness of breath.  He has been 

tolerating oral intake with no vomiting.  He has not yet walked with PT yet.





Objective


Vitals





 Vital Signs








  Date Time  Temp Pulse Resp B/P Pulse Ox O2 Delivery O2 Flow Rate FiO2


 


7/6/17 08:54 97.6 118 18 124/79 95   


 


7/6/17 03:36 97.5 112 18 120/74 94   


 


7/5/17 23:58 98.1 108 18 118/68 90   


 


7/5/17 19:24 97.6 102 18 106/69 97   


 


7/5/17 17:08 97.4 84 20 129/96 96   


 


7/5/17 16:30  105 20 123/78 98   


 


7/5/17 15:06  102 17 116/74 97 Room Air  


 


7/5/17 11:58  102 20 124/63 99 Room Air  








 I/O








 7/5/17 7/5/17 7/5/17 7/6/17 7/6/17 7/6/17





 07:00 15:00 23:00 07:00 15:00 23:00


 


Intake Total  2000 ml    


 


Output Total  300 ml  1960 ml  


 


Balance  1700 ml  -1960 ml  


 


      


 


Intake IV Total  2000 ml    


 


Output Urine Total  300 ml  1560 ml  


 


Stool Total    400 ml  


 


# Voids  1    








Result Diagram:  


7/5/17 1000                                                                    

            7/6/17 0649





Objective Remarks


GENERAL: Well-developed well-nourished.  In no acute distress.


SKIN: Warm and dry. No lesions noted.


HEENT: Normocephalic. Pupils equal and round. Mucous membranes pink and moist. 


CARDIOVASCULAR: Irregular rate and rhythm.  No murmur appreciated.


RESPIRATORY: No accessory muscle use. Clear to auscultation. Breath sounds 

equal bilaterally. 


GASTROINTESTINAL: Abdomen soft, non-tender, nondistended. Bowel sounds x4.


MUSCULOSKELETAL: No obvious deformities. No clubbing or cyanosis. No edema. 


NEUROLOGICAL: Awake and alert. No focal neurological deficits.  Moves upper and 

lower extremities spontaneously. Normal speech.


PSYCHIATRIC: Appropriate mood and affect; insight and judgment normal.





A/P


Assessment and Plan


82-year-old male with past medical history of A. fib, gout, prostate cancer s/p 

xrt, lupus in remission who presented with diarrhea and weakness





Gastroenteritis: Nausea, vomiting, diarrhea.  Nausea and vomiting have artery 

improved, however the patient continues with intractable liquid stools.  

Afebrile with no leukocytosis.  C. difficile negative, add antidiarrheals; 

Lomotil 1, Imodium prn, Lactinex.  Further stool studies pending. 





Dehydration: Creatinine 1.1, increased to 0.84 with IVF.  Bicarbonate is still 

mildly decreased.  Continue IVF.  Monitor BMP.





Acute weakness: Likely secondary to infection as above as well as right foot 

pain from recent gout flare.  PT eval.





Atrial fibrillation: Rate is currently elevated, likely due to dehydration.  

Increase metoprolol to 75 mg.  Continue Eliquis.  Monitor on telemetry.





Gout: No signs of acute flareup on exam.  Continue home colchicine.





DVT prophylaxis: On Eliquis.


Discharge Planning


No improvement in symptoms overnight.  Continue to clinically monitor.





1445 patient reassessed.  He reports continued profuse liquid stools despite 

Lomotil, asking for Imodium.  Stool studies negative, likely viral.  Patient 

cleared from PT perspective.  Discussed plan with the patient, hopefully 

discharge tomorrow if diarrhea starts improved.  Schedule Lomotil.








Douglas Renteria Jul 6, 2017 10:02

## 2017-07-07 VITALS
TEMPERATURE: 98 F | SYSTOLIC BLOOD PRESSURE: 110 MMHG | HEART RATE: 100 BPM | RESPIRATION RATE: 18 BRPM | OXYGEN SATURATION: 94 % | DIASTOLIC BLOOD PRESSURE: 63 MMHG

## 2017-07-07 VITALS
DIASTOLIC BLOOD PRESSURE: 66 MMHG | HEART RATE: 100 BPM | TEMPERATURE: 97.8 F | RESPIRATION RATE: 18 BRPM | SYSTOLIC BLOOD PRESSURE: 109 MMHG | OXYGEN SATURATION: 96 %

## 2017-07-07 VITALS — HEART RATE: 119 BPM

## 2017-07-07 VITALS
DIASTOLIC BLOOD PRESSURE: 59 MMHG | OXYGEN SATURATION: 95 % | RESPIRATION RATE: 18 BRPM | HEART RATE: 105 BPM | TEMPERATURE: 97.8 F | SYSTOLIC BLOOD PRESSURE: 104 MMHG

## 2017-07-07 VITALS — HEART RATE: 101 BPM

## 2017-07-07 LAB
ANION GAP SERPL CALC-SCNC: 6 MEQ/L (ref 5–15)
BASOPHILS # BLD AUTO: 0 TH/MM3 (ref 0–0.2)
BASOPHILS NFR BLD: 0.4 % (ref 0–2)
BUN SERPL-MCNC: 11 MG/DL (ref 7–18)
CHLORIDE SERPL-SCNC: 106 MEQ/L (ref 98–107)
EOSINOPHIL # BLD: 0.2 TH/MM3 (ref 0–0.4)
EOSINOPHIL NFR BLD: 2 % (ref 0–4)
ERYTHROCYTE [DISTWIDTH] IN BLOOD BY AUTOMATED COUNT: 13.8 % (ref 11.6–17.2)
GFR SERPLBLD BASED ON 1.73 SQ M-ARVRAT: 78 ML/MIN (ref 89–?)
HCO3 BLD-SCNC: 27.4 MEQ/L (ref 21–32)
HCT VFR BLD CALC: 36.4 % (ref 39–51)
HEMO FLAGS: (no result)
LYMPHOCYTES # BLD AUTO: 0.5 TH/MM3 (ref 1–4.8)
LYMPHOCYTES NFR BLD AUTO: 5.6 % (ref 9–44)
MCH RBC QN AUTO: 29.5 PG (ref 27–34)
MCHC RBC AUTO-ENTMCNC: 33.4 % (ref 32–36)
MCV RBC AUTO: 88.2 FL (ref 80–100)
MONOCYTES NFR BLD: 10.2 % (ref 0–8)
NEUTROPHILS # BLD AUTO: 6.7 TH/MM3 (ref 1.8–7.7)
NEUTROPHILS NFR BLD AUTO: 81.8 % (ref 16–70)
PLATELET # BLD: 322 TH/MM3 (ref 150–450)
POTASSIUM SERPL-SCNC: 4.2 MEQ/L (ref 3.5–5.1)
RBC # BLD AUTO: 4.13 MIL/MM3 (ref 4.5–5.9)
SODIUM SERPL-SCNC: 139 MEQ/L (ref 136–145)
WBC # BLD AUTO: 8.2 TH/MM3 (ref 4–11)

## 2017-07-07 RX ADMIN — DIPHENOXYLATE HYDROCHLORIDE AND ATROPINE SULFATE SCH TAB: 2.5; .025 TABLET ORAL at 08:58

## 2017-07-07 RX ADMIN — THIAMINE HYDROCHLORIDE SCH MLS/HR: 100 INJECTION, SOLUTION INTRAMUSCULAR; INTRAVENOUS at 06:02

## 2017-07-07 RX ADMIN — COLCHICINE SCH MG: 0.6 TABLET, FILM COATED ORAL at 13:00

## 2017-07-07 RX ADMIN — VITAMIN D, TAB 1000IU (100/BT) SCH UNITS: 25 TAB at 08:58

## 2017-07-07 RX ADMIN — DIPHENOXYLATE HYDROCHLORIDE AND ATROPINE SULFATE SCH TAB: 2.5; .025 TABLET ORAL at 06:00

## 2017-07-07 RX ADMIN — APIXABAN SCH MG: 5 TABLET, FILM COATED ORAL at 08:58

## 2017-07-07 RX ADMIN — METOPROLOL TARTRATE SCH MG: 25 TABLET, FILM COATED ORAL at 09:00

## 2017-07-07 RX ADMIN — Medication SCH TAB: at 13:00

## 2017-07-07 RX ADMIN — Medication SCH ML: at 08:58

## 2017-07-07 RX ADMIN — MULTIPLE VITAMINS W/ MINERALS TAB SCH TAB: TAB at 08:58

## 2017-07-07 RX ADMIN — Medication SCH TAB: at 08:58

## 2017-07-07 RX ADMIN — COLCHICINE SCH MG: 0.6 TABLET, FILM COATED ORAL at 08:58

## 2017-07-07 RX ADMIN — Medication SCH TAB: at 08:57

## 2017-07-07 NOTE — HHI.PR
Subjective


Remarks


Follow up for intractable diarrhea. The patient believes he is improving 

however still had episodes of diarrhea overnight. He feels his diarrhea is 

becoming slightly more formed. Denies abdominal pain/nausea/vomiting. He is 

looking forward to breakfast. He was able to get some sleep last night and does 

not feel as weak today compared to yesterday. He is hoping to go home today.





Objective


Vitals





 Vital Signs








  Date Time  Temp Pulse Resp B/P Pulse Ox O2 Delivery O2 Flow Rate FiO2


 


7/7/17 07:55 97.8 105 18 104/59 95   


 


7/7/17 03:56 97.8 100 18 109/66 96   


 


7/6/17 23:49 98.6 93 18 112/69 94   


 


7/6/17 20:36 97.8 114 18 110/64 95   


 


7/6/17 17:54  75      


 


7/6/17 17:44 97.9 103 20 116/73 92   


 


7/6/17 12:58 97.9 90 20 111/69 92   


 


7/6/17 10:37  85      


 


7/6/17 08:54 97.6 118 18 124/79 95   








Result Diagram:  


7/7/17 0626 7/7/17 0626





Objective Remarks


GENERAL: Well-nourished, well-developed elderly male patient in [facility].


SKIN: Warm and dry. No rash.


HEENT:  Normocephalic. Atraumatic. Pupils equal and round. Mucous membranes 

pink and moist.


NECK: Supple. Trachea midline.  


CARDIOVASCULAR: Regular rate and rhythm.  S1, S2 noted. No murmur appreciated. 


RESPIRATORY: No accessory muscle use. Clear to auscultation. Breath sounds 

equal bilaterally.  


GASTROINTESTINAL: Abdomen soft, non-tender, nondistended. Normoactive bowel 

sounds x4.


MUSCULOSKELETAL: No obvious deformities. Extremities without clubbing, cyanosis

, or edema. 


NEUROLOGICAL: Awake and alert. No obvious cranial nerve deficits.  Motor 

grossly within normal limits. Normal speech.


PSYCHIATRIC: Appropriate mood and affect; insight and judgment normal.


Medications and IVs





 Current Medications








 Medications


  (Trade)  Dose


 Ordered  Sig/Finn


 Route  Start Time


 Stop Time Status Last Admin


 


  (Eliquis)  5 mg  BID


 PO  7/5/17 21:00


    7/7/17 08:58


 


 


  (Vitamin D3)  1,000 units  DAILY


 PO  7/6/17 09:00


    7/7/17 08:58


 


 


  (Colchicine)  0.6 mg  TID


 PO  7/5/17 18:00


    7/7/17 08:58


 


 


  (Theragran M Tab)  1 tab  DAILY


 PO  7/6/17 09:00


    7/7/17 08:58


 


 


  (Ocuvite)  1 tab  DAILY


 PO  7/6/17 09:00


    7/7/17 08:57


 


 


 Latanoprost 1 drop  1 drop  HS


 EACH EYE  7/5/17 21:00


    7/6/17 20:24


 


 


  (1/2 NS 1000 ml


 Inj)  1,000 ml @ 


 75 mls/hr  [id number]


 IV  7/5/17 14:02


    7/7/17 06:02


 


 


  (NS Flush)  2 ml  UNSCH  PRN


 IV FLUSH  7/5/17 14:15


     


 


 


  (NS Flush)  2 ml  BID


 IV FLUSH  7/5/17 21:00


    7/7/17 08:58


 


 


  (Zofran Inj)  4 mg  Q6H  PRN


 IVP  7/5/17 14:15


    7/6/17 13:23


 


 


  (Restoril)  15 mg  HS  PRN


 PO  7/5/17 14:15


     


 


 


  (Narcan Inj)  0.4 mg  UNSCH  PRN


 IV  7/5/17 14:15


     


 


 


  (Milk Of


 Magnesia Liq)  30 ml  Q12H  PRN


 PO  7/5/17 14:15


     


 


 


  (Senokot)  17.2 mg  Q12H  PRN


 PO  7/5/17 14:15


     


 


 


  (Dulcolax Supp)  10 mg  DAILY  PRN


 RECTAL  7/5/17 14:15


     


 


 


  (Lactulose Liq)  30 ml  DAILY  PRN


 PO  7/5/17 14:15


     


 


 


  (Lopressor)  75 mg  Q12HR


 PO  7/6/17 09:00


    7/6/17 20:24


 


 


  (Imodium)  2 mg  UNSCH  PRN


 PO  7/6/17 10:00


    7/6/17 20:24


 


 


  (Lactinex)  1 tab  TID


 PO  7/6/17 13:00


    7/7/17 08:58


 


 


  (Lomotil Tab)  1 tab  Q8HR


 PO  7/6/17 22:00


    7/7/17 08:58


 











A/P


Assessment and Plan


82-year-old male with past medical history of A. fib, gout, prostate cancer s/p 

xrt, lupus in remission who presented with diarrhea and weakness





Gastroenteritis: presented with 5 days of intractable nausea/vomiting/diarrhea. 

N/V resolved, however the patient continues with intractable liquid stools.  

Afebrile, no leukocytosis.  C. difficile and stool studies negative. Added 

antidiarrheals with Lomotil q8h scheduled, Imodium prn, Lactinex.  Symptoms 

improving. 





Dehydration: Creatinine 1.1,  improved to 0.84 with IVF.  Bicarbonate improved.

  Continue IVF.  Monitor BMP.





Acute weakness: Likely secondary to infection as above as well as right foot 

pain from recent gout flare.  PT evaluated, no PT needed after discharge. 





Atrial fibrillation: Rate is currently elevated, likely due to dehydration.  

Increased metoprolol to 75 mg.  Continue Eliquis.  Monitor on telemetry.





Gout: No signs of acute flareup on exam.  Continue home medications. 





DVT prophylaxis: On Eliquis.


Discharge Planning


0840hrs: Likely discharge later today if diarrhea continues to improve. 

Discussed with RN. 





1300hrs: Patient reported no further episodes of diarrhea throughout the day. 

He wants to go home. He tolerated oral intake. Will discharge. 





Discharge patient to home


Condition on discharge: Improved


Heart Healthy Diet as tolerated


Ad Kelly activity


Rx written: Lomotil prn, Lactinex tid, metoprolol 75mg bid


Follow-up with primary care physician Dr. Jules in 2-3 days








Jagruti Villafana PA-C Jul 7, 2017 8:25 am

## 2017-07-07 NOTE — HHI.DCPOC
Discharge Care Plan


Diagnosis:  


(1) Diarrhea


(2) Gastroenteritis


Goals to Promote Your Health


* To prevent worsening of your condition and complications


* To maintain your health at the optimal level


Directions to Meet Your Goals


*** Take your medications as prescribed


*** Follow your dietary instruction


*** Follow activity as directed








*** Keep your appointments as scheduled


*** Take your immunizations and boosters as scheduled


*** If your symptoms worsen call your PCP, if no PCP go to Urgent Care Center 

or Emergency Room***


*** Smoking is Dangerous to Your Health. Avoid second hand smoke***


***Call the 24-hour hour crisis hotline for domestic abuse at 1-584.316.1889***








Jagruti Villafana PA-C Jul 7, 2017 9:11 am

## 2017-07-12 ENCOUNTER — HOSPITAL ENCOUNTER (EMERGENCY)
Dept: HOSPITAL 17 - NEPE | Age: 82
Discharge: HOME | End: 2017-07-12
Payer: MEDICARE

## 2017-07-12 VITALS
HEART RATE: 141 BPM | RESPIRATION RATE: 20 BRPM | TEMPERATURE: 98.8 F | SYSTOLIC BLOOD PRESSURE: 97 MMHG | OXYGEN SATURATION: 98 % | DIASTOLIC BLOOD PRESSURE: 61 MMHG

## 2017-07-12 VITALS
OXYGEN SATURATION: 99 % | DIASTOLIC BLOOD PRESSURE: 66 MMHG | RESPIRATION RATE: 16 BRPM | HEART RATE: 91 BPM | SYSTOLIC BLOOD PRESSURE: 113 MMHG

## 2017-07-12 VITALS
HEART RATE: 102 BPM | RESPIRATION RATE: 16 BRPM | DIASTOLIC BLOOD PRESSURE: 61 MMHG | OXYGEN SATURATION: 98 % | SYSTOLIC BLOOD PRESSURE: 111 MMHG

## 2017-07-12 VITALS — WEIGHT: 171.96 LBS | BODY MASS INDEX: 24.62 KG/M2 | HEIGHT: 70 IN

## 2017-07-12 DIAGNOSIS — R33.9: ICD-10-CM

## 2017-07-12 DIAGNOSIS — M32.9: ICD-10-CM

## 2017-07-12 DIAGNOSIS — R19.7: ICD-10-CM

## 2017-07-12 DIAGNOSIS — E83.42: ICD-10-CM

## 2017-07-12 DIAGNOSIS — R94.31: ICD-10-CM

## 2017-07-12 DIAGNOSIS — R91.8: Primary | ICD-10-CM

## 2017-07-12 DIAGNOSIS — E87.6: ICD-10-CM

## 2017-07-12 DIAGNOSIS — I10: ICD-10-CM

## 2017-07-12 DIAGNOSIS — R11.2: ICD-10-CM

## 2017-07-12 LAB
ACANTHOCYTES BLD QL SMEAR: (no result)
ALP SERPL-CCNC: 86 U/L (ref 45–117)
ALT SERPL-CCNC: 52 U/L (ref 12–78)
ANION GAP SERPL CALC-SCNC: 11 MEQ/L (ref 5–15)
AST SERPL-CCNC: 47 U/L (ref 15–37)
BACTERIA #/AREA URNS HPF: (no result) /HPF
BASOPHILS # BLD AUTO: 0 TH/MM3 (ref 0–0.2)
BASOPHILS NFR BLD AUTO: 1 % (ref 0–2)
BASOPHILS NFR BLD: 0.6 % (ref 0–2)
BILIRUB SERPL-MCNC: 0.5 MG/DL (ref 0.2–1)
BUN SERPL-MCNC: 26 MG/DL (ref 7–18)
CHLORIDE SERPL-SCNC: 102 MEQ/L (ref 98–107)
CK MB SERPL-MCNC: 4.1 NG/ML (ref 0.5–3.6)
CK SERPL-CCNC: 229 U/L (ref 39–308)
COLOR UR: YELLOW
COMMENT (UR): (no result)
CULTURE IF INDICATED: (no result)
EOSINOPHIL # BLD: 0.1 TH/MM3 (ref 0–0.4)
EOSINOPHIL NFR BLD: 2 % (ref 0–4)
EOSINOPHIL NFR BLD: 4 % (ref 0–4)
ERYTHROCYTE [DISTWIDTH] IN BLOOD BY AUTOMATED COUNT: 14.1 % (ref 11.6–17.2)
GFR SERPLBLD BASED ON 1.73 SQ M-ARVRAT: 56 ML/MIN (ref 89–?)
GLUCOSE UR STRIP-MCNC: (no result) MG/DL
HCO3 BLD-SCNC: 22.3 MEQ/L (ref 21–32)
HCT VFR BLD CALC: 33.8 % (ref 39–51)
HEMO FLAGS: (no result)
HGB UR QL STRIP: (no result)
KETONES UR STRIP-MCNC: (no result) MG/DL
LYMPHOCYTES # BLD AUTO: 0.7 TH/MM3 (ref 1–4.8)
LYMPHOCYTES NFR BLD AUTO: 11.8 % (ref 9–44)
MAGNESIUM SERPL-MCNC: 1.4 MG/DL (ref 1.5–2.5)
MCH RBC QN AUTO: 29.5 PG (ref 27–34)
MCHC RBC AUTO-ENTMCNC: 33.6 % (ref 32–36)
MCV RBC AUTO: 87.6 FL (ref 80–100)
MONOCYTES NFR BLD: 13.9 % (ref 0–8)
MYELOCYTES NFR BLD: 1 % (ref 0–0)
NEUTROPHILS # BLD AUTO: 4.4 TH/MM3 (ref 1.8–7.7)
NEUTROPHILS NFR BLD AUTO: 71.7 % (ref 16–70)
NEUTS BAND # BLD MANUAL: 4.5 TH/MM3 (ref 1.8–7.7)
NEUTS BAND NFR BLD: 1 % (ref 0–6)
NEUTS SEG NFR BLD MANUAL: 72 % (ref 16–70)
NITRITE UR QL STRIP: (no result)
OVALOCYTES BLD QL SMEAR: (no result)
PLAT MORPH BLD: NORMAL
PLATELET # BLD: 266 TH/MM3 (ref 150–450)
PLATELET BLD QL SMEAR: NORMAL
POTASSIUM SERPL-SCNC: 3.1 MEQ/L (ref 3.5–5.1)
RBC # BLD AUTO: 3.86 MIL/MM3 (ref 4.5–5.9)
SCAN/DIFF: (no result)
SODIUM SERPL-SCNC: 135 MEQ/L (ref 136–145)
SP GR UR STRIP: 1.01 (ref 1–1.03)
WBC # BLD AUTO: 6.1 TH/MM3 (ref 4–11)
WBC DIFF SAMPLE: 100

## 2017-07-12 PROCEDURE — 93005 ELECTROCARDIOGRAM TRACING: CPT

## 2017-07-12 PROCEDURE — 85007 BL SMEAR W/DIFF WBC COUNT: CPT

## 2017-07-12 PROCEDURE — 71250 CT THORAX DX C-: CPT

## 2017-07-12 PROCEDURE — 71010: CPT

## 2017-07-12 PROCEDURE — 82550 ASSAY OF CK (CPK): CPT

## 2017-07-12 PROCEDURE — 84443 ASSAY THYROID STIM HORMONE: CPT

## 2017-07-12 PROCEDURE — 80053 COMPREHEN METABOLIC PANEL: CPT

## 2017-07-12 PROCEDURE — 99285 EMERGENCY DEPT VISIT HI MDM: CPT

## 2017-07-12 PROCEDURE — 85027 COMPLETE CBC AUTOMATED: CPT

## 2017-07-12 PROCEDURE — 96374 THER/PROPH/DIAG INJ IV PUSH: CPT

## 2017-07-12 PROCEDURE — 83690 ASSAY OF LIPASE: CPT

## 2017-07-12 PROCEDURE — 83735 ASSAY OF MAGNESIUM: CPT

## 2017-07-12 PROCEDURE — 82552 ASSAY OF CPK IN BLOOD: CPT

## 2017-07-12 PROCEDURE — 51702 INSERT TEMP BLADDER CATH: CPT

## 2017-07-12 PROCEDURE — 81001 URINALYSIS AUTO W/SCOPE: CPT

## 2017-07-12 PROCEDURE — 84484 ASSAY OF TROPONIN QUANT: CPT

## 2017-07-12 NOTE — EKG
Date Performed: 07/12/2017       Time Performed: 02:31:27

 

PTAGE:      82 years

 

EKG:      ATRIAL FIBRILLATION WITH RAPID VENTRICULAR RESPONSE MARKED LEFT AXIS DEVIATION MODERATE INT
RAVENTRICULAR CONDUCTION DELAY NONSPECIFIC T-WAVE ABNORMALITY ABNORMAL ECG Compared to the 

 

 PREVIOUS TRACING             SR no longer present

 

DOCTOR:   Naresh Casas  Interpretating Date/Time  07/12/2017 10:54:04

## 2017-07-12 NOTE — RADRPT
EXAM DATE/TIME:  07/12/2017 02:17 

 

[facility] COMPARISON:     

No previous studies available for comparison.

 

                     

INDICATIONS :     

Palpitations.

                     

 

MEDICAL HISTORY :     

None.          

 

SURGICAL HISTORY :     

None.   

 

ENCOUNTER:     

Initial                                        

 

ACUITY:     

1 day      

 

PAIN SCORE:     

0/10

 

LOCATION:     

Bilateral  chest

 

FINDINGS:     

Faint infiltrate versus mass seen in the right midlung. I don't have any priors. Left lung is clear. 
No pleural effusion or pneumothorax on either side.

 

Heart size within normal limits. Thoracic aorta is mildly tortuous.

 

CONCLUSION:     

Faint nodular infiltrate versus mass of the right mid lung. Noncontrast chest CT is recommended. Lung
s otherwise appear clear.

 

 

 

 Chet Salazar MD on July 12, 2017 at 3:13           

Board Certified Radiologist.

 This report was verified electronically.

## 2017-07-12 NOTE — RADRPT
EXAM DATE/TIME:  07/12/2017 04:44 

 

[facility] COMPARISON:     

No previous studies available for comparison.

 

 

INDICATIONS :     

Evaluate for mass. Abnormal chest x-ray.

                      

 

RADIATION DOSE:     

3.47 CTDIvol (mGy) 

 

 

MEDICAL HISTORY :     

Cardiovascular disease. Hypertension. Carcinoma, prostate. 

 

SURGICAL HISTORY :      

None.  

 

ENCOUNTER:      

Initial

 

ACUITY:      

1 day

 

PAIN SCALE:      

0/10

 

LOCATION:        

chest 

 

TECHNIQUE:      

Volumetric scanning of the chest was performed.  Using automated exposure control and adjustment of t
he mA and/or kV according to patient size, radiation dose was kept as low as reasonably achievable to
 obtain optimal diagnostic quality images.  DICOM format image data is available electronically for r
eview and comparison.  

 

Follow-up recommendations for incidentally detected pulmonary nodules are based at a minimum on nodul
e size and patient risk factors according to Fleischner Society Guidelines.

 

FINDINGS:     

Dense parenchymal consolidation versus scar versus mass seen of the right lung apex, measures approxi
mately 13 x 18 mm in size. A 13 mm probably benign nodule is seen in the right middle lobe. A 4 mm no
dule is seen in the right lower lobe. There is a 7.5 mm probably benign lingular nodule on the left. 
A 5 mm subpleural nodule is seen in the left lower lobe Otherwise, there is patchy sub-solid/groundgl
ass consolidation of both lungs, mainly peripheral and dependent but involves all segments of all lob
es.

 

     No pleural effusion or pneumothorax.

 

     No mediastinal, hilar or axillary lymphadenopathy demonstrated. Heart size normal. There is erick
nary artery calcification noted. Atherosclerosis also seen of the aorta and arch vessels.

 

CONCLUSION:     

1. Multiple bilateral pulmonary nodular opacities, the most suspicious of which is of the right lung 
apex while the others are nonspecific but most likely benign. It would probably be difficult to sampl
e the apical nodule via percutaneous needle biopsy. An outpatient PET CT may be helpful.

2. Scattered/patchy areas of mainly groundglass infiltrates of both lungs seen as well. These are als
o nonspecific but most likely infectious or inflammatory. Bronchoalveolar cell carcinoma can appear s
imilar and CT surveillance is warranted with a noncontrast chest CT in approximately 6 months.

3. Atherosclerosis of the aorta, arch vessels and coronary arteries.

 

 

 

 

 Chet Salazar MD on July 12, 2017 at 5:05           

Board Certified Radiologist.

 This report was verified electronically.

## 2017-07-12 NOTE — PD
HPI


Chief Complaint:   Complaint


Time Seen by Provider:  01:55


Travel History


International Travel<30 days:  No


Contact w/Intl Traveler<30days:  No


Traveled to known affect area:  No





History of Present Illness


HPI


The patient is an 82 year old male who presents to the [facility] emergency 

department with a history of reportedly having difficulty urinating over the 

last few days.  He reports that he last urinated in the morning yesterday.  The 

patient reports that he was just recently admitted to the hospital related to 

nausea, vomiting, and diarrhea.  He reports that he was discharged home with a 

prescription for Lomotil.  He reports that his other recent new medications 

over the last 2 months related to recently being diagnosed with atrial 

fibrillation.  The patient was started on Eliquis and metoprolol.  Additionally

, approximately 2 weeks ago the patient reports that he developed a cramp in 

his leg and foot which is not unusual for him.  He reports that he attempted to 

stand up and walk the crampout, however the cramp was resistant to releasing in 

his foot.  He reports that eventually it did release and he developed swelling 

and pain in the foot associated with redness.  The patient went to see his 

primary care physician and was diagnosed with gout.  He reports that he was 

placed sign cultures seen approximately 2 weeks ago.  He reports that he had 

been taking it every 2 hours up until recently when he started to taper it.  He 

reports that he last took one pill yesterday.  The patient reports having 

abdominal pain and distention over his bladder.  The patient reports having a 

burning sensation when he tries to urinate.  The patient reports that when he 

was admitted to the hospital he did get catheterized briefly for a urine 

sample.  On review of systems, the patient denies any recent fevers, cough, 

congestion, neck pain, chest pain, shortness of breath, abdominal pain, or 

neurologic symptoms.  The patient reports that since being discharged from the 

hospital he has continued to have diarrhea although it has decreased in 

frequency is down to 3 times per day.  He reports that the nausea and vomiting 

has resolved.





[facility]


Past Medical History


*** Narrative Medical


The patient's past medical history is significant for atrial fibrillation, 

history of this gout, prostate cancer status post completion of treatment and 

2012, history of lupus that is in remission.


Hx Anticoagulant Therapy:  Yes (ELIQUIS)


Blood Disorders:  No


Depression:  No


Heart Rhythm Problems:  Yes (A fib)


Cancer:  Yes (prostate 2012)


Cardiovascular Problems:  Yes (HTN, AFIB)


High Cholesterol:  No


Chemotherapy:  No


Chest Pain:  No


Congestive Heart Failure:  No


Diabetes:  No


Endocrine:  No


Genitourinary:  No


Hiatal Hernia:  No


Hypertension:  Yes


Immune Disorder:  Yes (Lupus (in remission))


Musculoskeletal:  Yes (Right Foot Gout)


Neurologic:  No


Psychiatric:  No


Reproductive:  Yes (Prostate ca 2012)


Respiratory:  No


Radiation Therapy:  Yes (Radiation with prostate cancer )


Thyroid Disease:  No


Tetanus Vaccination:  Unknown


Influenza Vaccination:  Yes





Past Surgical History


*** Narrative Surgical


The patient's past surgical history is significant for a cataract surgery, TURP 

procedure.


Eye Surgery:  Yes (both eye cataract removed)


Genitourinary Surgery:  Yes (TURP)


Pacemaker:  No


Other Surgery:  Yes





Social History


Alcohol Use:  Yes (occassionaly )


Tobacco Use:  No


Substance Use:  No





Allergies-Medications


(Allergen,Severity, Reaction):  


Coded Allergies:  


     Latex (Verified  Allergy, Unknown, 7/12/17)


 causes hives


Reported Meds & Prescriptions





Reported Meds & Active Scripts


Active


Diphenoxylate-Atropine 2.5-0.025 Mg Tab 1 Tab PO Q8HR PRN


Acidophilus/l-Sporogenes (Lactobacillus Acidophilus) 1 Tab Tab 1 Tab PO TID 14 

Days


Metoprolol Tartrate 25 Mg Tab 75 Mg PO Q12HR 30 Days


Reported


Colchicine 0.6 Mg Cap 0.6 Mg PO BID


Diphenoxylate-Atropine 2.5-0.025 Mg Tab 1 Tab PO Q8HR PRN


Preservision Areds (Multiple Vitamins W/ Minerals) 1 Tab 1 Tab PO DAILY


Centrum (Multiple Vitamins W/ Minerals) 1 Chew 1 Tab CHEW DAILY


Flaxseed Oil (Flaxseed (Linseed)) 1,000 Mg Cap 1 Cap PO DAILY


Colchicine 0.6 Mg Tab 0.6 Mg PO TID


Eliquis (Apixaban) 5 Mg Tab 5 Mg PO BID


Travatan Z Opth Drops (Travoprost) 0.004 % Soln 1 Drop EACH EYE HS


Vitamin D3 (Cholecalciferol) 1,000 Unit Tab 1,000 Units PO DAILY


Biotin 10 Mg Tab 1,000 Mcg PO DAILY








Review of Systems


Except as stated in HPI:  all other systems reviewed are Neg


General / Constitutional:  No: Fever


Eyes:  No: Visual changes


HENT:  No: Headaches


Cardiovascular:  No: Chest Pain or Discomfort


Respiratory:  No: Shortness of Breath


Gastrointestinal:  Positive: Diarrhea,  No: Abdominal Pain, Changes in Bowel 

Habits, Indigestion, Loss of Appetite


Genitourinary:  No: Dysuria


Musculoskeletal:  No: Pain


Skin:  No Rash


Neurologic:  No: Weakness, Focal Abnormalities, Change in Mentation, Slurred 

Speech, Sensory Disturbance


Psychiatric:  No: Depression


Endocrine:  No: Polydipsia


Hematologic/Lymphatic:  No: Easy Bruising





Physical Exam


Narrative


General: 


The patient is a well-developed well-nourished male, uncomfortable appearing on 

arrival due to his urinary retention. 





Head and Neck exam: 


Head is normocephalic atraumatic. 


Eyes: EOMI, pupils are equal round and reactive to light. 


Nose: Midline septum with pink mucous membranes 


Mouth: Dentition unremarkable. Moist mucus membranes. Posterior oropharynx is 

not erythematous. No tonsillar hypertrophy. Uvula midline. Airway patent. 


Neck: No palpable lymphadenopathy. No nuchal rigidity. No thyromegaly. 





Cardiovascular: 


Irregularly irregular with a rate in the low 100s on arrival without murmurs, 

gallops, or rubs. 





Lungs: 


Clear to auscultation bilaterally. No wheezes, rhonchi, or rales.


 


Abdomen:


Soft, with suprapubic abdominal distention on palpation, palpable bladder 

distention with enlargement noted, no other tenderness on palpation of the 

other quadrants of the abdomen. No guarding, rebound, or rigidity.  Normal 

bowel sounds are audible.  No tenderness on palpation of McBurney's point.  

Negative [place] sign. 





Extremities: 


No clubbing, cyanosis, or edema. 2+ pulses in all 4 extremities.  No calf 

tenderness on palpation.





Back: 


No spinous process tenderness to palpation. No costovertebral angle tenderness 

to palpation. 





Neurologic Exam: Grossly nonfocal.





Skin Exam: No rash noted. Intact skin that is warm and dry.





Data


Data


Last Documented VS





Vital Signs








  Date Time  Temp Pulse Resp B/P Pulse Ox O2 Delivery O2 Flow Rate FiO2


 


7/12/17 04:00  91 16 113/66 99 Room Air  


 


7/12/17 01:26 98.8       








Orders





 Urinalysis - C+S If Indicated (7/12/17 02:00)


Urinary Catheter Insert/Apply (7/12/17 02:00)


Complete Blood Count With Diff (7/12/17 02:18)


Comprehensive Metabolic Panel (7/12/17 02:18)


Creatine Kinase (Cpk) (7/12/17 02:18)


Ckmb (Isoenzyme) Profile (7/12/17 02:18)


Troponin I (7/12/17 02:18)


Magnesium (Mg) (7/12/17 02:18)


Iv Access Insert/Monitor (7/12/17 02:18)


Ecg Monitoring (7/12/17 02:18)


Oximetry (7/12/17 02:18)


Electrocardiogram (7/12/17 02:18)


Chest, Single Ap (7/12/17 02:18)


Lipase (7/12/17 02:18)


Thyroid Stimulating Hormone (7/12/17 02:18)


Sodium Chlorid 0.9% 500 Ml Inj (Ns 500 M (7/12/17 02:30)


Magnesium Sulfate 1 Gm Premix (Magnesium (7/12/17 04:00)


Potassium Chloride (Kcl) (7/12/17 04:00)


Ct Thorax/ Chest Wo Iv Contras (7/12/17 03:57)


CKMB (7/12/17 02:30)


CKMB% (7/12/17 02:30)





Labs





 Laboratory Tests








Test 7/12/17 7/12/17





 02:25 02:30


 


Urine Color YELLOW  


 


Urine Turbidity CLEAR  


 


Urine pH 6.0  


 


Urine Specific Gravity 1.008  


 


Urine Protein NEG mg/dL 


 


Urine Glucose (UA) NEG mg/dL 


 


Urine Ketones NEG mg/dL 


 


Urine Occult Blood NEG  


 


Urine Nitrite NEG  


 


Urine Bilirubin NEG  


 


Urine Urobilinogen LESS THAN 2.0 





 MG/DL 


 


Urine Leukocyte Esterase TRACE  


 


Urine RBC LESS THAN 1 





 /hpf 


 


Urine WBC 1 /hpf 


 


Urine Bacteria RARE /hpf 


 


Microscopic Urinalysis Comment CULT NOT 





 INDICATED 


 


White Blood Count  6.1 TH/MM3


 


Red Blood Count  3.86 MIL/MM3


 


Hemoglobin  11.4 GM/DL


 


Hematocrit  33.8 %


 


Mean Corpuscular Volume  87.6 FL


 


Mean Corpuscular Hemoglobin  29.5 PG


 


Mean Corpuscular Hemoglobin  33.6 %





Concent  


 


Red Cell Distribution Width  14.1 %


 


Platelet Count  266 TH/MM3


 


Mean Platelet Volume  8.8 FL


 


Neutrophils (%) (Auto)  71.7 %


 


Lymphocytes (%) (Auto)  11.8 %


 


Monocytes (%) (Auto)  13.9 %


 


Eosinophils (%) (Auto)  2.0 %


 


Basophils (%) (Auto)  0.6 %


 


Neutrophils # (Auto)  4.4 TH/MM3


 


Lymphocytes # (Auto)  0.7 TH/MM3


 


Monocytes # (Auto)  0.9 TH/MM3


 


Eosinophils # (Auto)  0.1 TH/MM3


 


Basophils # (Auto)  0.0 TH/MM3


 


CBC Comment  AUTO DIFF 


 


Differential Total Cells  100 





Counted  


 


Neutrophils % (Manual)  72 %


 


Band Neutrophils %  1 %


 


Lymphocytes %  12 %


 


Monocytes %  9 %


 


Eosinophils %  4 %


 


Basophils %  1 %


 


Neutrophils # (Manual)  4.5 TH/MM3


 


Myelocytes  1 %


 


Differential Comment  FINAL DIFF





  MANUAL


 


Atypical Lymphocytes   %


 


Platelet Estimate  NORMAL 


 


Platelet Morphology Comment  NORMAL 


 


Ovalocytes  1+ 


 


Acanthocytes  OCC 


 


Sodium Level  135 MEQ/L


 


Potassium Level  3.1 MEQ/L


 


Chloride Level  102 MEQ/L


 


Carbon Dioxide Level  22.3 MEQ/L


 


Anion Gap  11 MEQ/L


 


Blood Urea Nitrogen  26 MG/DL


 


Creatinine  1.23 MG/DL


 


Estimat Glomerular Filtration  56 ML/MIN





Rate  


 


Random Glucose  89 MG/DL


 


Calcium Level  8.3 MG/DL


 


Magnesium Level  1.4 MG/DL


 


Total Bilirubin  0.5 MG/DL


 


Aspartate Amino Transf  47 U/L





(AST/SGOT)  


 


Alanine Aminotransferase  52 U/L





(ALT/SGPT)  


 


Alkaline Phosphatase  86 U/L


 


Total Creatine Kinase  229 U/L


 


Creatine Kinase MB  4.1 NG/ML


 


Troponin I  0.03 NG/ML


 


Total Protein  7.1 GM/DL


 


Albumin  2.9 GM/DL


 


Lipase  91 U/L


 


Thyroid Stimulating Hormone  1.340 uIU/ML





[facility]  











[facility]


Medical Decision Making


Medical Screen Exam Complete:  Yes


Emergency Medical Condition:  Yes


Medical Record Reviewed:  Yes


Differential Diagnosis


Urinary retention, versus urinary tract infection, versus renal failure


Narrative Course


During the course of the patients emergency department visit, the patients 

history, examination, and differential diagnosis were reviewed with the 

patient. The patient had  IV access obtained and blood work sent for analysis.  

The patient was placed on a cardiac monitor with oximetry and blood pressure 

monitoring.  The patient was initially quite tachycardic out in triage with a 

heart rate in the 140s.  An ECG was done on arrival.  The patient had a Foster 

catheter placed to gravity. 





The patient had reportedly 1400 mL of urine out after the catheter was placed.  

The patient's heart rate came down as he became more comfortable.





The patients laboratory studies were reviewed and remarkable for a white count 

of 6.1, hemoglobin 11.4, platelets 266, neutrophils 71.7, monocytes 13.9, CMP 

is remarkable for a sodium of 135, potassium 3.1, BUN 26, GFR 56, magnesium 1.4

, calcium 8.3, AST 47, lipase 91, urinalysis is unremarkable.





The patient was given magnesium 1 g IV, potassium 40 mEq by mouth 1 for 

replacement of hypokalemia.  The patient will be given a prescription for 

magnesium oxide and potassium supplement as an outpatient.  The patient is 

additionally days for his morning dose of metoprolol.  He will be given 25 mg, 

3 tablets per the recommendation of his discharge prescription by mouth prior 

to discharge.





Radiology studies were reviewed and remarkable for a chest x-ray that shows a 

faint nodular infiltrate versus mass of the right midlung, noncontrast chest CT 

is recommended, lungs otherwise appear clear.  CT scan of the chest reveals 

multiple bilateral pulmonary nodular opacities most suspicious is in the right 

lung apex it is recommended by the reading radiologist that area on outpatient 

PET scan be ordered.  I did discuss this with the patient's primary care 

physician, the doctor covering for him.  He reported that he will pass this 

along to the patient's physician.





The patient reported feeling greatly improved after the Foster catheter was 

placed.  The patient reports that he is followed by Dr. Bond for his urologic 

care.  He will call him this morning for a follow-up appointment.





Physician Communication


Physician Communication


I spoke to Dr. Sid Cast, the covering physician for Dr. Jules, the patient'

s primary care physician.  I explained the recent history for the patient 

including the CT scan findings that show an abnormal pulmonary nodule that 

requires further workup as an outpatient with a PET scan.  Dr. Davidson explained 

that he would be in contact with the patient's physician today and that the 

patient would be receiving a call from the office to set this up.





Diagnosis





 Primary Impression:  


 Multiple lung nodules on CT


 Additional Impressions:  


 Hypokalemia


 Hypomagnesemia


 Urinary retention


Referrals:  


Robel Bond MD


1 day





Marah Jules Jr., MD


1 day


Patient Instructions:  Foster Catheter Placement and Care (ED), General 

Instructions, Urinary Retention in Men (ED)





***Additional Instructions:


The patient is instructed regarding the importance of close follow-up with Dr. Bond and Dr. Jules today.


***Med/Other Pt SpecificInfo:  Prescription(s) given


Scripts


Potassium Chloride ER (K-Tab)20 Meq Tab20 Meq PO BID  10 Days  Ref 0


   Prov:Pamela Colón MD         7/12/17 


Magnesium Oxide 400 Mg [id number] Mg PO BID  10 Days  Ref 0


   Prov:Pamela Colón MD         7/12/17


Disposition:  01 DISCHARGE HOME


Condition:  Stable








Pamela Colón MD Jul 12, 2017 02:42

## 2017-08-01 ENCOUNTER — HOSPITAL ENCOUNTER (OUTPATIENT)
Dept: HOSPITAL 17 - CLAB | Age: 82
End: 2017-08-01
Attending: INTERNAL MEDICINE
Payer: MEDICARE

## 2017-08-01 DIAGNOSIS — I42.0: ICD-10-CM

## 2017-08-01 DIAGNOSIS — I48.0: Primary | ICD-10-CM

## 2017-08-01 DIAGNOSIS — I44.30: ICD-10-CM

## 2017-08-01 DIAGNOSIS — E78.00: ICD-10-CM

## 2017-08-01 LAB
ALT SERPL-CCNC: 36 U/L (ref 12–78)
AST SERPL-CCNC: 30 U/L (ref 15–37)
HDLC SERPL-MCNC: 44.4 MG/DL (ref 40–60)
LDLC SERPL-MCNC: 77 MG/DL (ref 0–99)

## 2017-08-01 PROCEDURE — 84450 TRANSFERASE (AST) (SGOT): CPT

## 2017-08-01 PROCEDURE — 84443 ASSAY THYROID STIM HORMONE: CPT

## 2017-08-01 PROCEDURE — 80061 LIPID PANEL: CPT

## 2017-08-01 PROCEDURE — 36415 COLL VENOUS BLD VENIPUNCTURE: CPT

## 2017-08-01 PROCEDURE — 84460 ALANINE AMINO (ALT) (SGPT): CPT

## 2017-08-21 ENCOUNTER — HOSPITAL ENCOUNTER (OUTPATIENT)
Dept: HOSPITAL 17 - HRSP | Age: 82
End: 2017-08-21
Attending: INTERNAL MEDICINE
Payer: MEDICARE

## 2017-08-21 DIAGNOSIS — J44.9: Primary | ICD-10-CM

## 2017-08-21 PROCEDURE — 94060 EVALUATION OF WHEEZING: CPT

## 2017-08-21 PROCEDURE — 94726 PLETHYSMOGRAPHY LUNG VOLUMES: CPT

## 2017-08-21 PROCEDURE — 94620: CPT

## 2017-08-21 PROCEDURE — 94729 DIFFUSING CAPACITY: CPT

## 2017-08-24 NOTE — RSPPFT
DATE OF PROCEDURE:   8/21/17



COMMENTS:   



VOLUMES

DYNAMIC:    FVC and FEV1 normal.

STATIC:    RV, FRC and TLC normal.

FLOWS:    FEV1% and FEF 25-75 normal.

DIFFUSION;    Mildly reduced.

FLOW VOLUME

      LOOP:    Normal configuration.  



IMPRESSION:    

   

Essentially normal pulmonary functions with no significant airways obstruction or 
restriction but there is a mild reduction in diffusion possibly related to prior smoking. 
No improvement post-bronchodilator.

## 2017-08-25 ENCOUNTER — HOSPITAL ENCOUNTER (OUTPATIENT)
Dept: HOSPITAL 17 - CPRE | Age: 82
End: 2017-08-25
Attending: INTERNAL MEDICINE
Payer: MEDICARE

## 2017-08-25 DIAGNOSIS — Z01.812: ICD-10-CM

## 2017-08-25 DIAGNOSIS — Z79.01: ICD-10-CM

## 2017-08-25 DIAGNOSIS — R91.1: Primary | ICD-10-CM

## 2017-08-25 LAB
ANION GAP SERPL CALC-SCNC: 4 MEQ/L (ref 5–15)
APTT BLD: 28.6 SEC (ref 24.3–30.1)
BUN SERPL-MCNC: 19 MG/DL (ref 7–18)
CHLORIDE SERPL-SCNC: 106 MEQ/L (ref 98–107)
ERYTHROCYTE [DISTWIDTH] IN BLOOD BY AUTOMATED COUNT: 16.8 % (ref 11.6–17.2)
GFR SERPLBLD BASED ON 1.73 SQ M-ARVRAT: 67 ML/MIN (ref 89–?)
HCO3 BLD-SCNC: 28.1 MEQ/L (ref 21–32)
HCT VFR BLD CALC: 36.4 % (ref 39–51)
INR PPP: 1 RATIO
MCH RBC QN AUTO: 30.9 PG (ref 27–34)
MCHC RBC AUTO-ENTMCNC: 33.2 % (ref 32–36)
MCV RBC AUTO: 92.9 FL (ref 80–100)
PLATELET # BLD: 224 TH/MM3 (ref 150–450)
POTASSIUM SERPL-SCNC: 4.3 MEQ/L (ref 3.5–5.1)
PROTHROMBIN TIME: 11.3 SEC (ref 9.8–11.6)
RBC # BLD AUTO: 3.92 MIL/MM3 (ref 4.5–5.9)
REVIEW FLAG: (no result)
SODIUM SERPL-SCNC: 138 MEQ/L (ref 136–145)
WBC # BLD AUTO: 7.5 TH/MM3 (ref 4–11)

## 2017-08-25 PROCEDURE — 85027 COMPLETE CBC AUTOMATED: CPT

## 2017-08-25 PROCEDURE — 85610 PROTHROMBIN TIME: CPT

## 2017-08-25 PROCEDURE — 80048 BASIC METABOLIC PNL TOTAL CA: CPT

## 2017-08-25 PROCEDURE — 85730 THROMBOPLASTIN TIME PARTIAL: CPT

## 2017-08-25 PROCEDURE — 36415 COLL VENOUS BLD VENIPUNCTURE: CPT

## 2017-08-29 ENCOUNTER — HOSPITAL ENCOUNTER (OUTPATIENT)
Dept: HOSPITAL 17 - HROP | Age: 82
Discharge: HOME | End: 2017-08-29
Attending: INTERNAL MEDICINE
Payer: MEDICARE

## 2017-08-29 VITALS
RESPIRATION RATE: 16 BRPM | HEART RATE: 92 BPM | OXYGEN SATURATION: 93 % | SYSTOLIC BLOOD PRESSURE: 115 MMHG | DIASTOLIC BLOOD PRESSURE: 59 MMHG

## 2017-08-29 VITALS
TEMPERATURE: 97.8 F | DIASTOLIC BLOOD PRESSURE: 72 MMHG | OXYGEN SATURATION: 98 % | SYSTOLIC BLOOD PRESSURE: 106 MMHG | RESPIRATION RATE: 18 BRPM | HEART RATE: 101 BPM

## 2017-08-29 VITALS
RESPIRATION RATE: 16 BRPM | HEART RATE: 100 BPM | SYSTOLIC BLOOD PRESSURE: 114 MMHG | DIASTOLIC BLOOD PRESSURE: 73 MMHG | OXYGEN SATURATION: 96 %

## 2017-08-29 VITALS — BODY MASS INDEX: 22.16 KG/M2 | HEIGHT: 71 IN | WEIGHT: 158.29 LBS

## 2017-08-29 VITALS — TEMPERATURE: 97.5 F

## 2017-08-29 DIAGNOSIS — R91.8: ICD-10-CM

## 2017-08-29 DIAGNOSIS — I48.91: ICD-10-CM

## 2017-08-29 DIAGNOSIS — H40.9: ICD-10-CM

## 2017-08-29 DIAGNOSIS — Z87.891: ICD-10-CM

## 2017-08-29 DIAGNOSIS — Z79.899: ICD-10-CM

## 2017-08-29 DIAGNOSIS — J98.4: Primary | ICD-10-CM

## 2017-08-29 DIAGNOSIS — I10: ICD-10-CM

## 2017-08-29 DIAGNOSIS — Z85.46: ICD-10-CM

## 2017-08-29 DIAGNOSIS — Z85.828: ICD-10-CM

## 2017-08-29 DIAGNOSIS — Z79.01: ICD-10-CM

## 2017-08-29 PROCEDURE — 87015 SPECIMEN INFECT AGNT CONCNTJ: CPT

## 2017-08-29 PROCEDURE — 87070 CULTURE OTHR SPECIMN AEROBIC: CPT

## 2017-08-29 PROCEDURE — 71010: CPT

## 2017-08-29 PROCEDURE — 94664 DEMO&/EVAL PT USE INHALER: CPT

## 2017-08-29 PROCEDURE — 76000 FLUOROSCOPY <1 HR PHYS/QHP: CPT

## 2017-08-29 PROCEDURE — 88305 TISSUE EXAM BY PATHOLOGIST: CPT

## 2017-08-29 PROCEDURE — 71250 CT THORAX DX C-: CPT

## 2017-08-29 PROCEDURE — 88307 TISSUE EXAM BY PATHOLOGIST: CPT

## 2017-08-29 PROCEDURE — 87077 CULTURE AEROBIC IDENTIFY: CPT

## 2017-08-29 PROCEDURE — 87205 SMEAR GRAM STAIN: CPT

## 2017-08-29 PROCEDURE — 87206 SMEAR FLUORESCENT/ACID STAI: CPT

## 2017-08-29 PROCEDURE — 87116 MYCOBACTERIA CULTURE: CPT

## 2017-08-29 PROCEDURE — 94640 AIRWAY INHALATION TREATMENT: CPT

## 2017-08-29 PROCEDURE — 31628 BRONCHOSCOPY/LUNG BX EACH: CPT

## 2017-08-29 PROCEDURE — 00520 ANES CLOSED CHEST PX NOS: CPT

## 2017-08-29 PROCEDURE — 82948 REAGENT STRIP/BLOOD GLUCOSE: CPT

## 2017-08-29 PROCEDURE — 87102 FUNGUS ISOLATION CULTURE: CPT

## 2017-08-29 PROCEDURE — 88112 CYTOPATH CELL ENHANCE TECH: CPT

## 2017-08-29 NOTE — MP
cc:

EV JESSICA M.D.

****

 

 

DATE OF SURGERY

8/29/17

 

PROCEDURE

Bronchoscopy.

 

INDICATION

Pulmonary infiltrates, right upper lobe nodular density.

 

With general anesthesia the patient underwent diagnostic bronchoscopy after

informed consent was obtained.  His Eliquis had been held for 3 days.

 

PROCEDURE

Examination of the mid to distal trachea was normal.  Examination of the left

main stem bronchus, left upper lobe lingula and lower lobe orifices was

entirely unremarkable.

 

Examination of the right main stem bronchus, right upper, middle and lower

lobes again was entirely unremarkable.

 

The pathology on CT scan was primarily in the right upper lobe.  This was the

area of the nodular density.

 

Utilizing navigational technology the nodular infiltrate was localized to the

apical segment of the right upper lobe.  This area was washed and brushed

extensively, submitted for cytology and cultures.

 

Again, using navigational technology in the anterolateral region of the right

upper lobe again an area of ground glass density was identified and this area

was washed and brushed extensively and submitted for culture and cytology.

 

Fluoroscopic guidance was then used to identify the area of infiltrate and

ground glass in the right upper lobe and several transbronchial biopsies were

obtained from this area and submitted for routine pathology.

 

He did have minimal bleeding, none at the end of the procedure and is

transferred to recovery in stable condition.  Chest x-ray is pending at this

time.

 

 

 

SUMMARY

In summary, this is a normal endoscopic exam.  Multiple specimens were taken

from the right upper lobe for culture, cytology and pathology.

 

 

 

 

 

 

                              _________________________________

                              EV Jessica MD

 

 

 

 

 

 

RSW/MERVIN

D:  8/29/2017/4:51 PM

T:  8/29/2017/11:40 PM

Visit #:  [id number]

Job #:  [id number]

## 2017-08-29 NOTE — RADRPT
EXAM DATE/TIME:  08/29/2017 12:43 

 

[facility] COMPARISON:     CT THORAX W/O CONTRAST, July 12, 2017, 4:44.  CHEST SINGLE AP, July 12, 2017,
 2:17.

 

INDICATIONS :     Pre op navigational bronchoscopy.

                      

RADIATION DOSE:     5.85 CTDIvol (mGy) 

 

MEDICAL HISTORY :     Cardiovascular disease. Carcinoma, prostate.  

SURGICAL HISTORY :      None.  

ENCOUNTER:      Initial

ACUITY:      1 day

PAIN SCALE:      0/10

LOCATION:        chest 

 

TECHNIQUE:      Volumetric scanning of the chest was performed using inspiration and expiration dallin
cols.  The study is performed using fiduciary markers for virtual bronchoscopy. Using automated expos
ure control and adjustment of the mA and/or kV according to patient size, radiation dose was kept as 
low as reasonably achievable to obtain optimal diagnostic quality images.   DICOM format image data i
s available electronically for review and comparison.  

 

Follow-up recommendations for detected pulmonary nodules are based at a minimum on nodule size and pa
tient risk factors according to Fleischner Society Guidelines.

 

FINDINGS:     Multiple bilateral pulmonary opacities somewhat nodular most having the appearance of a
irspace type disease however in the right upper lobe a few centimeters spiculated solid mass is appre
ciated. These are not significantly changed.

                                                          There is extensive atherosclerotic cardiova
scular disease with calcifications in the aorta and great vessels as well as coronary artery calcific
ations.

 

 

CONCLUSION:     Stable CT scan chest multiple scattered nodular densities multiple areas of groundgla
ss appearance infiltrate. These findings are nonspecific. There is a 2 cm right upper lobe spiculated
 soft tissue mass density suspicious for carcinoma. 

 

 

 Sherif Crook MD on August 29, 2017 at 13:19           

Board Certified Radiologist.

 This report was verified electronically.

## 2017-08-29 NOTE — RADRPT
EXAM DATE/TIME:  08/29/2017 16:57 

 

[facility] COMPARISON:     

CT THORAX W/O CONTRAST INSP/EXPIR, NAVIGATION, August 29, 2017, 12:43.  CHEST SINGLE AP, July 12, 201
7, 2:17.

 

                     

INDICATIONS :     

Post biopsy right upper lobe.

                     

 

MEDICAL HISTORY :     

None.          

 

SURGICAL HISTORY :     

None.   

 

ENCOUNTER:     

Initial                                        

 

ACUITY:     

1 day      

 

PAIN SCORE:     

0/10

 

LOCATION:     

Bilateral  chest

 

FINDINGS:     

There is no evidence of of pneumothorax following biopsy. Infiltrate, pleural thickening and medial a
pical mass are noted on the right. There is diffuse interstitial prominence throughout the remainder 
of the right lung and mild fine interstitial prominence on the contralateral left side. No evidence o
f layering effusion. Cardiac contours are stable.

 

CONCLUSION:     

No evidence of pneumothorax. Interstitial prominence, right worse than left. Right apical infiltrate,
 nodule and pleural thickening

 

 

 

 Chet Horton MD on August 29, 2017 at 17:09           

Board Certified Radiologist.

 This report was verified electronically.

## 2017-10-02 ENCOUNTER — HOSPITAL ENCOUNTER (OUTPATIENT)
Dept: HOSPITAL 17 - CLAB | Age: 82
End: 2017-10-02
Attending: INTERNAL MEDICINE
Payer: MEDICARE

## 2017-10-02 DIAGNOSIS — I48.1: Primary | ICD-10-CM

## 2017-10-02 LAB
ANION GAP SERPL CALC-SCNC: 6 MEQ/L (ref 5–15)
BUN SERPL-MCNC: 16 MG/DL (ref 7–18)
CHLORIDE SERPL-SCNC: 105 MEQ/L (ref 98–107)
ERYTHROCYTE [DISTWIDTH] IN BLOOD BY AUTOMATED COUNT: 16.3 % (ref 11.6–17.2)
GFR SERPLBLD BASED ON 1.73 SQ M-ARVRAT: 56 ML/MIN (ref 89–?)
HCO3 BLD-SCNC: 26.7 MEQ/L (ref 21–32)
HCT VFR BLD CALC: 38 % (ref 39–51)
MCH RBC QN AUTO: 31.4 PG (ref 27–34)
MCHC RBC AUTO-ENTMCNC: 33.8 % (ref 32–36)
MCV RBC AUTO: 92.7 FL (ref 80–100)
PLATELET # BLD: 217 TH/MM3 (ref 150–450)
POTASSIUM SERPL-SCNC: 4.5 MEQ/L (ref 3.5–5.1)
RBC # BLD AUTO: 4.1 MIL/MM3 (ref 4.5–5.9)
REVIEW FLAG: (no result)
SODIUM SERPL-SCNC: 138 MEQ/L (ref 136–145)
WBC # BLD AUTO: 7 TH/MM3 (ref 4–11)

## 2017-10-02 PROCEDURE — 36415 COLL VENOUS BLD VENIPUNCTURE: CPT

## 2017-10-02 PROCEDURE — 80048 BASIC METABOLIC PNL TOTAL CA: CPT

## 2017-10-02 PROCEDURE — 85027 COMPLETE CBC AUTOMATED: CPT

## 2017-10-09 ENCOUNTER — HOSPITAL ENCOUNTER (OUTPATIENT)
Dept: HOSPITAL 17 - HDOC | Age: 82
Discharge: HOME | End: 2017-10-09
Attending: INTERNAL MEDICINE
Payer: MEDICARE

## 2017-10-09 DIAGNOSIS — I48.91: ICD-10-CM

## 2017-10-09 DIAGNOSIS — I44.4: ICD-10-CM

## 2017-10-09 DIAGNOSIS — I48.92: Primary | ICD-10-CM

## 2017-10-09 PROCEDURE — 93005 ELECTROCARDIOGRAM TRACING: CPT

## 2017-10-09 PROCEDURE — 92960 CARDIOVERSION ELECTRIC EXT: CPT

## 2017-10-09 NOTE — EKG
Date Performed: 10/09/2017       Time Performed: 07:02:14

 

PTAGE:      82 years

 

EKG:      Atrial fibrillation. Left anterior fascicular block IV conduction defect Lateral T wave mei
nges are nonspecific Abnormal ECG Compared to prior tracing no significant change 

 

 PREVIOUS TRACING            : 07/12/2017 02.31

 

DOCTOR:   Shan Burr  Interpretating Date/Time  10/09/2017 12:35:36

## 2017-10-09 NOTE — EKG
Date Performed: 10/09/2017       Time Performed: 08:39:26

 

PTAGE:      82 years

 

EKG:      Sinus bradycardia. Left axis deviation IV conduction defect Compared to previous tracing Si
nus rhythm 

 

 has replaced atrial fibrillation Abnormal ECG

 

PREVIOUS TRACING       : 10/09/2017 07.02

 

DOCTOR:   Shan Burr  Interpretating Date/Time  10/09/2017 16:15:36

## 2017-12-04 ENCOUNTER — HOSPITAL ENCOUNTER (OUTPATIENT)
Dept: HOSPITAL 17 - CLAB | Age: 82
End: 2017-12-04
Attending: ALLERGY & IMMUNOLOGY
Payer: MEDICARE

## 2017-12-04 DIAGNOSIS — M32.9: Primary | ICD-10-CM

## 2017-12-04 LAB
ACANTHOCYTES BLD QL SMEAR: (no result)
ALP SERPL-CCNC: 62 U/L (ref 45–117)
ALT SERPL-CCNC: 29 U/L (ref 12–78)
ANION GAP SERPL CALC-SCNC: 7 MEQ/L (ref 5–15)
AST SERPL-CCNC: 27 U/L (ref 15–37)
BASOPHILS # BLD AUTO: 0.1 TH/MM3 (ref 0–0.2)
BASOPHILS NFR BLD AUTO: 1 % (ref 0–2)
BASOPHILS NFR BLD: 1 % (ref 0–2)
BILIRUB SERPL-MCNC: 0.4 MG/DL (ref 0.2–1)
BUN SERPL-MCNC: 15 MG/DL (ref 7–18)
CHLORIDE SERPL-SCNC: 106 MEQ/L (ref 98–107)
COLOR UR: YELLOW
EOSINOPHIL # BLD: 0.3 TH/MM3 (ref 0–0.4)
EOSINOPHIL NFR BLD: 3.4 % (ref 0–4)
EOSINOPHIL NFR BLD: 5 % (ref 0–4)
ERYTHROCYTE [DISTWIDTH] IN BLOOD BY AUTOMATED COUNT: 14.1 % (ref 11.6–17.2)
ERYTHROCYTE [SEDIMENTATION RATE] IN BLOOD BY WESTERGREN METHOD: 14 MM/HR (ref 0–20)
GFR SERPLBLD BASED ON 1.73 SQ M-ARVRAT: 68 ML/MIN (ref 89–?)
GLUCOSE UR STRIP-MCNC: (no result) MG/DL
HCO3 BLD-SCNC: 26.5 MEQ/L (ref 21–32)
HCT VFR BLD CALC: 37.1 % (ref 39–51)
HEMO FLAGS: (no result)
HGB UR QL STRIP: (no result)
KETONES UR STRIP-MCNC: (no result) MG/DL
LYMPHOCYTES # BLD AUTO: 0.9 TH/MM3 (ref 1–4.8)
LYMPHOCYTES NFR BLD AUTO: 11.9 % (ref 9–44)
MCH RBC QN AUTO: 31.3 PG (ref 27–34)
MCHC RBC AUTO-ENTMCNC: 33.8 % (ref 32–36)
MCV RBC AUTO: 92.4 FL (ref 80–100)
METAMYELOCYTES NFR BLD: 3 % (ref 0–1)
MONOCYTES NFR BLD: 12.4 % (ref 0–8)
MYELOCYTES NFR BLD: 1 % (ref 0–0)
NEUTROPHILS # BLD AUTO: 5.3 TH/MM3 (ref 1.8–7.7)
NEUTROPHILS NFR BLD AUTO: 71.3 % (ref 16–70)
NEUTS BAND # BLD MANUAL: 5.3 TH/MM3 (ref 1.8–7.7)
NEUTS BAND NFR BLD: 1 % (ref 0–6)
NEUTS SEG NFR BLD MANUAL: 67 % (ref 16–70)
NITRITE UR QL STRIP: (no result)
OVALOCYTES BLD QL SMEAR: (no result)
PLAT MORPH BLD: NORMAL
PLATELET # BLD: 208 TH/MM3 (ref 150–450)
PLATELET BLD QL SMEAR: NORMAL
POTASSIUM SERPL-SCNC: 4.2 MEQ/L (ref 3.5–5.1)
RBC # BLD AUTO: 4.02 MIL/MM3 (ref 4.5–5.9)
SCAN/DIFF: (no result)
SODIUM SERPL-SCNC: 139 MEQ/L (ref 136–145)
SP GR UR STRIP: 1.01 (ref 1–1.03)
WBC # BLD AUTO: 7.4 TH/MM3 (ref 4–11)
WBC DIFF SAMPLE: 100

## 2017-12-04 PROCEDURE — 85027 COMPLETE CBC AUTOMATED: CPT

## 2017-12-04 PROCEDURE — 81001 URINALYSIS AUTO W/SCOPE: CPT

## 2017-12-04 PROCEDURE — 85652 RBC SED RATE AUTOMATED: CPT

## 2017-12-04 PROCEDURE — 85007 BL SMEAR W/DIFF WBC COUNT: CPT

## 2017-12-04 PROCEDURE — 36415 COLL VENOUS BLD VENIPUNCTURE: CPT

## 2017-12-04 PROCEDURE — 86140 C-REACTIVE PROTEIN: CPT

## 2017-12-04 PROCEDURE — 80053 COMPREHEN METABOLIC PANEL: CPT

## 2018-02-07 ENCOUNTER — HOSPITAL ENCOUNTER (OUTPATIENT)
Dept: HOSPITAL 17 - CLAB | Age: 83
End: 2018-02-07
Attending: INTERNAL MEDICINE
Payer: MEDICARE

## 2018-02-07 DIAGNOSIS — I48.1: Primary | ICD-10-CM

## 2018-02-07 DIAGNOSIS — R79.89: ICD-10-CM

## 2018-02-07 DIAGNOSIS — I42.0: ICD-10-CM

## 2018-02-07 DIAGNOSIS — I44.4: ICD-10-CM

## 2018-02-07 DIAGNOSIS — M32.8: ICD-10-CM

## 2018-02-07 LAB
ALBUMIN SERPL-MCNC: 3.5 GM/DL (ref 3.4–5)
ALP SERPL-CCNC: 65 U/L (ref 45–117)
ALT SERPL-CCNC: 26 U/L (ref 12–78)
ANA SER QL: (no result)
AST SERPL-CCNC: 27 U/L (ref 15–37)
BASOPHILS # BLD AUTO: 0.1 TH/MM3 (ref 0–0.2)
BASOPHILS NFR BLD: 1 % (ref 0–2)
BILIRUB SERPL-MCNC: 0.5 MG/DL (ref 0.2–1)
BUN SERPL-MCNC: 22 MG/DL (ref 7–18)
CALCIUM SERPL-MCNC: 9.1 MG/DL (ref 8.5–10.1)
CHLORIDE SERPL-SCNC: 106 MEQ/L (ref 98–107)
CREAT SERPL-MCNC: 1.18 MG/DL (ref 0.6–1.3)
CRP SERPL-MCNC: (no result) MG/DL (ref 0–0.3)
EOSINOPHIL # BLD: 0.3 TH/MM3 (ref 0–0.4)
EOSINOPHIL NFR BLD: 3.9 % (ref 0–4)
ERYTHROCYTE [DISTWIDTH] IN BLOOD BY AUTOMATED COUNT: 13.9 % (ref 11.6–17.2)
ERYTHROCYTE [SEDIMENTATION RATE] IN BLOOD BY WESTERGREN METHOD: 31 MM/HR (ref 0–20)
GFR SERPLBLD BASED ON 1.73 SQ M-ARVRAT: 59 ML/MIN (ref 89–?)
HCO3 BLD-SCNC: 27.8 MEQ/L (ref 21–32)
HCT VFR BLD CALC: 35.9 % (ref 39–51)
HGB BLD-MCNC: 12.4 GM/DL (ref 13–17)
LYMPHOCYTES # BLD AUTO: 0.8 TH/MM3 (ref 1–4.8)
LYMPHOCYTES NFR BLD AUTO: 11 % (ref 9–44)
LYMPHOCYTES: 10 % (ref 9–44)
MCH RBC QN AUTO: 32.5 PG (ref 27–34)
MCHC RBC AUTO-ENTMCNC: 34.6 % (ref 32–36)
MCV RBC AUTO: 94 FL (ref 80–100)
MONOCYTE #: 1 TH/MM3 (ref 0–0.9)
MONOCYTES NFR BLD: 12.7 % (ref 0–8)
MONOCYTES: 6 % (ref 0–8)
MYELOCYTES NFR BLD: 1 % (ref 0–0)
NEUTROPHILS # BLD AUTO: 5.4 TH/MM3 (ref 1.8–7.7)
NEUTROPHILS NFR BLD AUTO: 71.4 % (ref 16–70)
NEUTS BAND # BLD MANUAL: 5.9 TH/MM3 (ref 1.8–7.7)
NEUTS BAND NFR BLD: 12 % (ref 0–6)
NEUTS SEG NFR BLD MANUAL: 65 % (ref 16–70)
OVALOCYTES BLD QL SMEAR: (no result)
PLATELET # BLD: 240 TH/MM3 (ref 150–450)
PMV BLD AUTO: 7.4 FL (ref 7–11)
PROT SERPL-MCNC: 7.2 GM/DL (ref 6.4–8.2)
RBC # BLD AUTO: 3.82 MIL/MM3 (ref 4.5–5.9)
SODIUM SERPL-SCNC: 139 MEQ/L (ref 136–145)
WBC # BLD AUTO: 7.6 TH/MM3 (ref 4–11)

## 2018-02-07 PROCEDURE — 86038 ANTINUCLEAR ANTIBODIES: CPT

## 2018-02-07 PROCEDURE — 86140 C-REACTIVE PROTEIN: CPT

## 2018-02-07 PROCEDURE — 84443 ASSAY THYROID STIM HORMONE: CPT

## 2018-02-07 PROCEDURE — 80053 COMPREHEN METABOLIC PANEL: CPT

## 2018-02-07 PROCEDURE — 36415 COLL VENOUS BLD VENIPUNCTURE: CPT

## 2018-02-07 PROCEDURE — 86039 ANTINUCLEAR ANTIBODIES (ANA): CPT

## 2018-02-07 PROCEDURE — 85027 COMPLETE CBC AUTOMATED: CPT

## 2018-02-07 PROCEDURE — 85007 BL SMEAR W/DIFF WBC COUNT: CPT

## 2018-02-07 PROCEDURE — 85652 RBC SED RATE AUTOMATED: CPT

## 2018-02-09 LAB — ANA PATTERN: (no result)

## 2018-05-04 ENCOUNTER — HOSPITAL ENCOUNTER (OUTPATIENT)
Dept: HOSPITAL 17 - CLAB | Age: 83
End: 2018-05-04
Payer: MEDICARE

## 2018-05-04 DIAGNOSIS — E78.5: ICD-10-CM

## 2018-05-04 DIAGNOSIS — L93.0: Primary | ICD-10-CM

## 2018-05-04 DIAGNOSIS — C61: ICD-10-CM

## 2018-05-04 LAB
ALBUMIN SERPL-MCNC: 3.4 GM/DL (ref 3.4–5)
ALP SERPL-CCNC: 55 U/L (ref 45–117)
ALT SERPL-CCNC: 27 U/L (ref 12–78)
AST SERPL-CCNC: 36 U/L (ref 15–37)
BILIRUB SERPL-MCNC: 0.3 MG/DL (ref 0.2–1)
BUN SERPL-MCNC: 18 MG/DL (ref 7–18)
CALCIUM SERPL-MCNC: 8.5 MG/DL (ref 8.5–10.1)
CHLORIDE SERPL-SCNC: 107 MEQ/L (ref 98–107)
CHOLEST SERPL-MCNC: 158 MG/DL (ref 120–200)
CHOLESTEROL/ HDL RATIO: 2.85 RATIO
CREAT SERPL-MCNC: 1.13 MG/DL (ref 0.6–1.3)
ERYTHROCYTE [DISTWIDTH] IN BLOOD BY AUTOMATED COUNT: 13.1 % (ref 11.6–17.2)
GFR SERPLBLD BASED ON 1.73 SQ M-ARVRAT: 62 ML/MIN (ref 89–?)
HCO3 BLD-SCNC: 26.7 MEQ/L (ref 21–32)
HCT VFR BLD CALC: 37 % (ref 39–51)
HDLC SERPL-MCNC: 55.3 MG/DL (ref 40–60)
HGB BLD-MCNC: 12.4 GM/DL (ref 13–17)
LDLC SERPL-MCNC: 95 MG/DL (ref 0–99)
MCH RBC QN AUTO: 31.3 PG (ref 27–34)
MCHC RBC AUTO-ENTMCNC: 33.6 % (ref 32–36)
MCV RBC AUTO: 93.2 FL (ref 80–100)
PLATELET # BLD: 243 TH/MM3 (ref 150–450)
PMV BLD AUTO: 7.8 FL (ref 7–11)
PROT SERPL-MCNC: 7 GM/DL (ref 6.4–8.2)
RBC # BLD AUTO: 3.97 MIL/MM3 (ref 4.5–5.9)
SODIUM SERPL-SCNC: 142 MEQ/L (ref 136–145)
TRIGL SERPL-MCNC: 40 MG/DL (ref 42–150)
WBC # BLD AUTO: 6.6 TH/MM3 (ref 4–11)

## 2018-05-04 PROCEDURE — 84443 ASSAY THYROID STIM HORMONE: CPT

## 2018-05-04 PROCEDURE — 36415 COLL VENOUS BLD VENIPUNCTURE: CPT

## 2018-05-04 PROCEDURE — 80061 LIPID PANEL: CPT

## 2018-05-04 PROCEDURE — 85027 COMPLETE CBC AUTOMATED: CPT

## 2018-05-04 PROCEDURE — 80053 COMPREHEN METABOLIC PANEL: CPT
